# Patient Record
Sex: MALE | Race: WHITE | NOT HISPANIC OR LATINO | Employment: FULL TIME | URBAN - METROPOLITAN AREA
[De-identification: names, ages, dates, MRNs, and addresses within clinical notes are randomized per-mention and may not be internally consistent; named-entity substitution may affect disease eponyms.]

---

## 2018-06-16 ENCOUNTER — HOSPITAL ENCOUNTER (INPATIENT)
Facility: HOSPITAL | Age: 63
LOS: 3 days | Discharge: HOME | DRG: 637 | End: 2018-06-19
Attending: EMERGENCY MEDICINE | Admitting: FAMILY MEDICINE
Payer: COMMERCIAL

## 2018-06-16 DIAGNOSIS — E11.40 DIABETIC NEUROPATHY (HCC): ICD-10-CM

## 2018-06-16 DIAGNOSIS — R77.8 ELEVATED TROPONIN: ICD-10-CM

## 2018-06-16 DIAGNOSIS — R73.9 HYPERGLYCEMIA: Primary | ICD-10-CM

## 2018-06-16 DIAGNOSIS — E11.9 NEW ONSET TYPE 2 DIABETES MELLITUS (HCC): ICD-10-CM

## 2018-06-16 DIAGNOSIS — E87.6 HYPOKALEMIA: ICD-10-CM

## 2018-06-16 DIAGNOSIS — E11.65 TYPE 2 DIABETES MELLITUS WITH HYPERGLYCEMIA (HCC): ICD-10-CM

## 2018-06-16 PROBLEM — I10 HYPERTENSION: Status: ACTIVE | Noted: 2018-06-16

## 2018-06-16 PROBLEM — R79.89 ELEVATED TROPONIN: Status: ACTIVE | Noted: 2018-06-16

## 2018-06-16 LAB
ALBUMIN SERPL BCP-MCNC: 4.1 G/DL (ref 3.5–5)
ALP SERPL-CCNC: 97 U/L (ref 46–116)
ALT SERPL W P-5'-P-CCNC: 41 U/L (ref 12–78)
ANION GAP SERPL CALCULATED.3IONS-SCNC: 8 MMOL/L (ref 4–13)
AST SERPL W P-5'-P-CCNC: 16 U/L (ref 5–45)
BASOPHILS # BLD AUTO: 0.05 THOUSANDS/ΜL (ref 0–0.1)
BASOPHILS NFR BLD AUTO: 1 % (ref 0–1)
BILIRUB SERPL-MCNC: 0.6 MG/DL (ref 0.2–1)
BUN SERPL-MCNC: 20 MG/DL (ref 5–25)
CALCIUM SERPL-MCNC: 9.5 MG/DL (ref 8.3–10.1)
CHLORIDE SERPL-SCNC: 95 MMOL/L (ref 100–108)
CO2 SERPL-SCNC: 27 MMOL/L (ref 21–32)
CREAT SERPL-MCNC: 1.18 MG/DL (ref 0.6–1.3)
EOSINOPHIL # BLD AUTO: 0.32 THOUSAND/ΜL (ref 0–0.61)
EOSINOPHIL NFR BLD AUTO: 5 % (ref 0–6)
ERYTHROCYTE [DISTWIDTH] IN BLOOD BY AUTOMATED COUNT: 13 % (ref 11.6–15.1)
GFR SERPL CREATININE-BSD FRML MDRD: 64 ML/MIN/1.73SQ M
GLUCOSE SERPL-MCNC: 270 MG/DL (ref 65–140)
GLUCOSE SERPL-MCNC: 330 MG/DL (ref 65–140)
GLUCOSE SERPL-MCNC: 340 MG/DL (ref 65–140)
HCT VFR BLD AUTO: 44.4 % (ref 36.5–49.3)
HGB BLD-MCNC: 15.7 G/DL (ref 12–17)
IMM GRANULOCYTES # BLD AUTO: 0.03 THOUSAND/UL (ref 0–0.2)
IMM GRANULOCYTES NFR BLD AUTO: 0 % (ref 0–2)
LYMPHOCYTES # BLD AUTO: 1.94 THOUSANDS/ΜL (ref 0.6–4.47)
LYMPHOCYTES NFR BLD AUTO: 29 % (ref 14–44)
MCH RBC QN AUTO: 28.4 PG (ref 26.8–34.3)
MCHC RBC AUTO-ENTMCNC: 35.4 G/DL (ref 31.4–37.4)
MCV RBC AUTO: 80 FL (ref 82–98)
MONOCYTES # BLD AUTO: 0.53 THOUSAND/ΜL (ref 0.17–1.22)
MONOCYTES NFR BLD AUTO: 8 % (ref 4–12)
NEUTROPHILS # BLD AUTO: 3.92 THOUSANDS/ΜL (ref 1.85–7.62)
NEUTS SEG NFR BLD AUTO: 57 % (ref 43–75)
NRBC BLD AUTO-RTO: 0 /100 WBCS
PLATELET # BLD AUTO: 229 THOUSANDS/UL (ref 149–390)
PMV BLD AUTO: 10.6 FL (ref 8.9–12.7)
POTASSIUM SERPL-SCNC: 3.1 MMOL/L (ref 3.5–5.3)
PROT SERPL-MCNC: 8 G/DL (ref 6.4–8.2)
RBC # BLD AUTO: 5.52 MILLION/UL (ref 3.88–5.62)
SODIUM SERPL-SCNC: 130 MMOL/L (ref 136–145)
TROPONIN I SERPL-MCNC: 0.09 NG/ML
WBC # BLD AUTO: 6.79 THOUSAND/UL (ref 4.31–10.16)

## 2018-06-16 PROCEDURE — 93005 ELECTROCARDIOGRAM TRACING: CPT

## 2018-06-16 PROCEDURE — 82948 REAGENT STRIP/BLOOD GLUCOSE: CPT

## 2018-06-16 PROCEDURE — 85025 COMPLETE CBC W/AUTO DIFF WBC: CPT | Performed by: EMERGENCY MEDICINE

## 2018-06-16 PROCEDURE — 99285 EMERGENCY DEPT VISIT HI MDM: CPT

## 2018-06-16 PROCEDURE — 36415 COLL VENOUS BLD VENIPUNCTURE: CPT

## 2018-06-16 PROCEDURE — 80053 COMPREHEN METABOLIC PANEL: CPT | Performed by: EMERGENCY MEDICINE

## 2018-06-16 PROCEDURE — 84484 ASSAY OF TROPONIN QUANT: CPT | Performed by: EMERGENCY MEDICINE

## 2018-06-16 RX ORDER — SODIUM CHLORIDE 9 MG/ML
75 INJECTION, SOLUTION INTRAVENOUS CONTINUOUS
Status: DISCONTINUED | OUTPATIENT
Start: 2018-06-16 | End: 2018-06-19

## 2018-06-16 RX ORDER — ACETAMINOPHEN 325 MG/1
650 TABLET ORAL EVERY 6 HOURS PRN
Status: DISCONTINUED | OUTPATIENT
Start: 2018-06-16 | End: 2018-06-19 | Stop reason: HOSPADM

## 2018-06-16 RX ORDER — ASPIRIN 81 MG/1
81 TABLET ORAL DAILY
Status: DISCONTINUED | OUTPATIENT
Start: 2018-06-17 | End: 2018-06-19 | Stop reason: HOSPADM

## 2018-06-16 RX ORDER — HYDROCHLOROTHIAZIDE 25 MG/1
25 TABLET ORAL DAILY
Status: DISCONTINUED | OUTPATIENT
Start: 2018-06-17 | End: 2018-06-19 | Stop reason: HOSPADM

## 2018-06-16 RX ORDER — AMLODIPINE BESYLATE 10 MG/1
10 TABLET ORAL DAILY
Status: DISCONTINUED | OUTPATIENT
Start: 2018-06-17 | End: 2018-06-19 | Stop reason: HOSPADM

## 2018-06-16 RX ORDER — HYDROCHLOROTHIAZIDE 25 MG/1
25 TABLET ORAL DAILY
COMMUNITY

## 2018-06-16 RX ORDER — QUINAPRIL 10 MG/1
10 TABLET ORAL DAILY
COMMUNITY

## 2018-06-16 RX ORDER — AMLODIPINE BESYLATE 5 MG/1
10 TABLET ORAL DAILY
COMMUNITY

## 2018-06-16 RX ORDER — QUINAPRIL 20 MG/1
10 TABLET ORAL DAILY
Status: DISCONTINUED | OUTPATIENT
Start: 2018-06-17 | End: 2018-06-19 | Stop reason: HOSPADM

## 2018-06-16 RX ORDER — ASPIRIN 81 MG/1
81 TABLET ORAL DAILY
COMMUNITY

## 2018-06-16 RX ORDER — ASPIRIN 81 MG/1
324 TABLET, CHEWABLE ORAL ONCE
Status: COMPLETED | OUTPATIENT
Start: 2018-06-16 | End: 2018-06-16

## 2018-06-16 RX ADMIN — SODIUM CHLORIDE 75 ML/HR: 0.9 INJECTION, SOLUTION INTRAVENOUS at 22:57

## 2018-06-16 RX ADMIN — ASPIRIN 81 MG 324 MG: 81 TABLET ORAL at 20:31

## 2018-06-16 RX ADMIN — SODIUM CHLORIDE 1000 ML: 0.9 INJECTION, SOLUTION INTRAVENOUS at 20:33

## 2018-06-16 RX ADMIN — INSULIN LISPRO 4 UNITS: 100 INJECTION, SOLUTION INTRAVENOUS; SUBCUTANEOUS at 22:56

## 2018-06-17 ENCOUNTER — APPOINTMENT (INPATIENT)
Dept: NON INVASIVE DIAGNOSTICS | Facility: HOSPITAL | Age: 63
DRG: 637 | End: 2018-06-17
Payer: COMMERCIAL

## 2018-06-17 LAB
ANION GAP SERPL CALCULATED.3IONS-SCNC: 10 MMOL/L (ref 4–13)
BASOPHILS # BLD AUTO: 0.06 THOUSANDS/ΜL (ref 0–0.1)
BASOPHILS NFR BLD AUTO: 1 % (ref 0–1)
BUN SERPL-MCNC: 14 MG/DL (ref 5–25)
CALCIUM SERPL-MCNC: 8.6 MG/DL (ref 8.3–10.1)
CHLORIDE SERPL-SCNC: 101 MMOL/L (ref 100–108)
CHOLEST SERPL-MCNC: 151 MG/DL (ref 50–200)
CHOLEST SERPL-MCNC: 152 MG/DL (ref 50–200)
CO2 SERPL-SCNC: 26 MMOL/L (ref 21–32)
CREAT SERPL-MCNC: 0.96 MG/DL (ref 0.6–1.3)
EOSINOPHIL # BLD AUTO: 0.28 THOUSAND/ΜL (ref 0–0.61)
EOSINOPHIL NFR BLD AUTO: 5 % (ref 0–6)
ERYTHROCYTE [DISTWIDTH] IN BLOOD BY AUTOMATED COUNT: 13.1 % (ref 11.6–15.1)
EST. AVERAGE GLUCOSE BLD GHB EST-MCNC: 260 MG/DL
GFR SERPL CREATININE-BSD FRML MDRD: 83 ML/MIN/1.73SQ M
GLUCOSE SERPL-MCNC: 243 MG/DL (ref 65–140)
GLUCOSE SERPL-MCNC: 250 MG/DL (ref 65–140)
GLUCOSE SERPL-MCNC: 265 MG/DL (ref 65–140)
GLUCOSE SERPL-MCNC: 270 MG/DL (ref 65–140)
GLUCOSE SERPL-MCNC: 380 MG/DL (ref 65–140)
HBA1C MFR BLD: 10.7 % (ref 4.2–6.3)
HCT VFR BLD AUTO: 40.9 % (ref 36.5–49.3)
HDLC SERPL-MCNC: 31 MG/DL (ref 40–60)
HDLC SERPL-MCNC: 32 MG/DL (ref 40–60)
HGB BLD-MCNC: 14.3 G/DL (ref 12–17)
IMM GRANULOCYTES # BLD AUTO: 0.02 THOUSAND/UL (ref 0–0.2)
IMM GRANULOCYTES NFR BLD AUTO: 0 % (ref 0–2)
LDLC SERPL CALC-MCNC: 89 MG/DL (ref 0–100)
LDLC SERPL CALC-MCNC: 90 MG/DL (ref 0–100)
LYMPHOCYTES # BLD AUTO: 1.53 THOUSANDS/ΜL (ref 0.6–4.47)
LYMPHOCYTES NFR BLD AUTO: 26 % (ref 14–44)
MAGNESIUM SERPL-MCNC: 1.8 MG/DL (ref 1.6–2.6)
MCH RBC QN AUTO: 28.4 PG (ref 26.8–34.3)
MCHC RBC AUTO-ENTMCNC: 35 G/DL (ref 31.4–37.4)
MCV RBC AUTO: 81 FL (ref 82–98)
MONOCYTES # BLD AUTO: 0.41 THOUSAND/ΜL (ref 0.17–1.22)
MONOCYTES NFR BLD AUTO: 7 % (ref 4–12)
NEUTROPHILS # BLD AUTO: 3.71 THOUSANDS/ΜL (ref 1.85–7.62)
NEUTS SEG NFR BLD AUTO: 61 % (ref 43–75)
NONHDLC SERPL-MCNC: 120 MG/DL
NRBC BLD AUTO-RTO: 0 /100 WBCS
PLATELET # BLD AUTO: 216 THOUSANDS/UL (ref 149–390)
PMV BLD AUTO: 11 FL (ref 8.9–12.7)
POTASSIUM SERPL-SCNC: 2.9 MMOL/L (ref 3.5–5.3)
RBC # BLD AUTO: 5.04 MILLION/UL (ref 3.88–5.62)
SODIUM SERPL-SCNC: 137 MMOL/L (ref 136–145)
TRIGL SERPL-MCNC: 150 MG/DL
TRIGL SERPL-MCNC: 154 MG/DL
TROPONIN I SERPL-MCNC: 0.11 NG/ML
TROPONIN I SERPL-MCNC: 0.13 NG/ML
TROPONIN I SERPL-MCNC: 0.13 NG/ML
TROPONIN I SERPL-MCNC: 0.14 NG/ML
TSH SERPL DL<=0.05 MIU/L-ACNC: 1.94 UIU/ML (ref 0.36–3.74)
WBC # BLD AUTO: 6.01 THOUSAND/UL (ref 4.31–10.16)

## 2018-06-17 PROCEDURE — 99223 1ST HOSP IP/OBS HIGH 75: CPT | Performed by: FAMILY MEDICINE

## 2018-06-17 PROCEDURE — 83735 ASSAY OF MAGNESIUM: CPT | Performed by: NURSE PRACTITIONER

## 2018-06-17 PROCEDURE — 80061 LIPID PANEL: CPT | Performed by: NURSE PRACTITIONER

## 2018-06-17 PROCEDURE — 82948 REAGENT STRIP/BLOOD GLUCOSE: CPT

## 2018-06-17 PROCEDURE — 85025 COMPLETE CBC W/AUTO DIFF WBC: CPT | Performed by: NURSE PRACTITIONER

## 2018-06-17 PROCEDURE — 80048 BASIC METABOLIC PNL TOTAL CA: CPT | Performed by: NURSE PRACTITIONER

## 2018-06-17 PROCEDURE — 84484 ASSAY OF TROPONIN QUANT: CPT | Performed by: INTERNAL MEDICINE

## 2018-06-17 PROCEDURE — 83036 HEMOGLOBIN GLYCOSYLATED A1C: CPT | Performed by: NURSE PRACTITIONER

## 2018-06-17 PROCEDURE — 84443 ASSAY THYROID STIM HORMONE: CPT | Performed by: NURSE PRACTITIONER

## 2018-06-17 PROCEDURE — 84484 ASSAY OF TROPONIN QUANT: CPT | Performed by: NURSE PRACTITIONER

## 2018-06-17 PROCEDURE — 93306 TTE W/DOPPLER COMPLETE: CPT

## 2018-06-17 RX ORDER — POTASSIUM CHLORIDE 20 MEQ/1
40 TABLET, EXTENDED RELEASE ORAL ONCE
Status: COMPLETED | OUTPATIENT
Start: 2018-06-17 | End: 2018-06-17

## 2018-06-17 RX ORDER — POTASSIUM CHLORIDE 14.9 MG/ML
20 INJECTION INTRAVENOUS ONCE
Status: COMPLETED | OUTPATIENT
Start: 2018-06-17 | End: 2018-06-17

## 2018-06-17 RX ADMIN — AMLODIPINE BESYLATE 10 MG: 10 TABLET ORAL at 09:01

## 2018-06-17 RX ADMIN — ASPIRIN 81 MG: 81 TABLET, COATED ORAL at 09:01

## 2018-06-17 RX ADMIN — POTASSIUM CHLORIDE 20 MEQ: 200 INJECTION, SOLUTION INTRAVENOUS at 06:32

## 2018-06-17 RX ADMIN — INSULIN LISPRO 3 UNITS: 100 INJECTION, SOLUTION INTRAVENOUS; SUBCUTANEOUS at 22:50

## 2018-06-17 RX ADMIN — INSULIN LISPRO 6 UNITS: 100 INJECTION, SOLUTION INTRAVENOUS; SUBCUTANEOUS at 17:45

## 2018-06-17 RX ADMIN — ACETAMINOPHEN 650 MG: 325 TABLET, FILM COATED ORAL at 20:44

## 2018-06-17 RX ADMIN — POTASSIUM CHLORIDE 40 MEQ: 1500 TABLET, EXTENDED RELEASE ORAL at 06:29

## 2018-06-17 RX ADMIN — INSULIN LISPRO 3 UNITS: 100 INJECTION, SOLUTION INTRAVENOUS; SUBCUTANEOUS at 08:53

## 2018-06-17 RX ADMIN — INSULIN LISPRO 3 UNITS: 100 INJECTION, SOLUTION INTRAVENOUS; SUBCUTANEOUS at 12:55

## 2018-06-17 RX ADMIN — QUINAPRIL HYDROCHLORIDE 10 MG: 20 TABLET, FILM COATED ORAL at 09:00

## 2018-06-17 RX ADMIN — HYDROCHLOROTHIAZIDE 25 MG: 25 TABLET ORAL at 09:00

## 2018-06-17 RX ADMIN — SODIUM CHLORIDE 75 ML/HR: 0.9 INJECTION, SOLUTION INTRAVENOUS at 12:57

## 2018-06-17 NOTE — PLAN OF CARE
CARDIOVASCULAR - ADULT     Maintains optimal cardiac output and hemodynamic stability Progressing     Absence of cardiac dysrhythmias or at baseline rhythm Progressing        Potential for Falls     Patient will remain free of falls Progressing        SAFETY ADULT     Maintain or return to baseline ADL function Progressing     Maintain or return mobility status to optimal level Progressing     Patient will remain free of falls Progressing

## 2018-06-17 NOTE — PLAN OF CARE
Maintains optimal cardiac output and hemodynamic stability Progressing      Discharge to home or other facility with appropriate resources Progressing      Absence or prevention of progression during hospitalization Progressing      Patient/family/caregiver demonstrates understanding of disease process, treatment plan, medications, and discharge instructions Progressing      Electrolytes maintained within normal limits Progressing      Fluid balance maintained Progressing      Glucose maintained within target range Progressing      Patient will remain free of falls Progressing

## 2018-06-17 NOTE — PLAN OF CARE
CARDIOVASCULAR - ADULT     Maintains optimal cardiac output and hemodynamic stability Progressing     Absence of cardiac dysrhythmias or at baseline rhythm Progressing        DISCHARGE PLANNING     Discharge to home or other facility with appropriate resources Progressing        INFECTION - ADULT     Absence or prevention of progression during hospitalization Progressing        Knowledge Deficit     Patient/family/caregiver demonstrates understanding of disease process, treatment plan, medications, and discharge instructions Progressing        METABOLIC, FLUID AND ELECTROLYTES - ADULT     Electrolytes maintained within normal limits Progressing     Fluid balance maintained Progressing     Glucose maintained within target range Progressing        Potential for Falls     Patient will remain free of falls Progressing        SAFETY ADULT     Maintain or return to baseline ADL function Progressing     Maintain or return mobility status to optimal level Progressing     Patient will remain free of falls Progressing

## 2018-06-17 NOTE — ASSESSMENT & PLAN NOTE
Stable  Pre-hospital, patient report was not feeling well, excessive thirst, blurred vision, urinary frequency, check his blood sugar at home noted to be greater than 500  Patient report new onset of symptoms  Recent doctor's visit about 6 months ago  SSI initiated with coverage  Diabetic education  F/U Consult Nutrition  Monitor associated risk factors

## 2018-06-17 NOTE — CONSULTS
Consultation - Cardiology   Shanice Rondon 70 y o  male MRN: [id number]  Unit/Bed#: -01 Encounter: [id number]    Assessment/Plan     Assessment:  1  Indeterminate trop X 3  2  HTN   3  DM 2     Plan:  1  Obtain trop X 2 or until value trending down  2  Echo     History of Present Illness   Physician Requesting Consult: Kingsley Garrison,*  Reason for Consult / Principal Problem: elv trop  HPI: Shanice Rondon is a 70y o  year old male who presents with elevated sugars in the 400s  The patient PMH is significant for HTN and DM 2 only  He reports he came to ED cause his sugars were in the 400s and not coming down  He had trop X 3 obtained all which are in the indeterminate range  He denies chest pain or SOB with activity  He denies palpitations, swelling, syncope or LOC  He denies any family hx of CAD  Consults    Review of Systems   Constitutional: Negative  Respiratory: Negative  Cardiovascular: Negative  Neurological: Negative for dizziness, syncope and light-headedness  Historical Information   Past Medical History:   Diagnosis Date    Hypertension      Past Surgical History:   Procedure Laterality Date    CHOLECYSTECTOMY       History   Alcohol Use No     History   Drug Use No     History   Smoking Status    Never Smoker   Smokeless Tobacco    Never Used     Family History: History reviewed  No pertinent family history      Meds/Allergies   all current active meds have been reviewed, current meds:   Current Facility-Administered Medications   Medication Dose Route Frequency    acetaminophen (TYLENOL) tablet 650 mg  650 mg Oral Q6H PRN    amLODIPine (NORVASC) tablet 10 mg  10 mg Oral Daily    aspirin (ECOTRIN LOW STRENGTH) EC tablet 81 mg  81 mg Oral Daily    hydrochlorothiazide (HYDRODIURIL) tablet 25 mg  25 mg Oral Daily    insulin lispro (HumaLOG) 100 units/mL subcutaneous injection 1-6 Units  1-6 Units Subcutaneous TID AC    insulin lispro (HumaLOG) 100 units/mL subcutaneous injection 1-6 Units  1-6 Units Subcutaneous HS    quinapril (ACCUPRIL) tablet 10 mg  10 mg Oral Daily    sodium chloride 0 9 % infusion  75 mL/hr Intravenous Continuous    and PTA meds:   Prior to Admission Medications   Prescriptions Last Dose Informant Patient Reported? Taking? amLODIPine (NORVASC) 5 mg tablet  Self Yes Yes   Sig: Take 10 mg by mouth daily     aspirin (ECOTRIN LOW STRENGTH) 81 mg EC tablet   Yes Yes   Sig: Take 81 mg by mouth daily   hydrochlorothiazide (HYDRODIURIL) 25 mg tablet   Yes Yes   Sig: Take 25 mg by mouth daily   quinapril (ACCUPRIL) 10 mg tablet  Self Yes Yes   Sig: Take 10 mg by mouth daily      Facility-Administered Medications: None     No Known Allergies    Objective   Vitals: Blood pressure 128/69, pulse (!) 51, temperature 98 °F (36 7 °C), temperature source Oral, resp  rate 17, height 5' 4" (1 626 m), SpO2 96 %  Orthostatic Blood Pressures      Most Recent Value   Blood Pressure  128/69 filed at 06/17/2018 0700   Patient Position - Orthostatic VS  Lying filed at 06/17/2018 0700            Intake/Output Summary (Last 24 hours) at 06/17/18 1037  Last data filed at 06/17/18 0900   Gross per 24 hour   Intake              340 ml   Output                0 ml   Net              340 ml       Invasive Devices     Peripheral Intravenous Line            Peripheral IV 06/16/18 Right Antecubital less than 1 day                Physical Exam   Constitutional: He is oriented to person, place, and time  He appears well-developed and well-nourished  HENT:   Head: Normocephalic  Eyes: Pupils are equal, round, and reactive to light  Neck: Normal range of motion  Neck supple  Cardiovascular: Normal rate, regular rhythm, normal heart sounds and intact distal pulses  Exam reveals no gallop and no friction rub  No murmur heard  Pulmonary/Chest: Effort normal and breath sounds normal    Abdominal: Soft  Bowel sounds are normal    Musculoskeletal: He exhibits no edema  Neurological: He is alert and oriented to person, place, and time  Skin: Skin is warm and dry  Lab Results:   I have personally reviewed pertinent lab results  CBC with diff:   Results from last 7 days  Lab Units 06/17/18  0445   WBC Thousand/uL 6 01   RBC Million/uL 5 04   HEMOGLOBIN g/dL 14 3   HEMATOCRIT % 40 9   MCV fL 81*   MCH pg 28 4   MCHC g/dL 35 0   RDW % 13 1   MPV fL 11 0   PLATELETS Thousands/uL 216     CMP:   Results from last 7 days  Lab Units 06/17/18  0445 06/16/18  1930   SODIUM mmol/L 137 130*   POTASSIUM mmol/L 2 9* 3 1*   CHLORIDE mmol/L 101 95*   CO2 mmol/L 26 27   ANION GAP mmol/L 10 8   BUN mg/dL 14 20   CREATININE mg/dL 0 96 1 18   GLUCOSE RANDOM mg/dL 270* 340*   CALCIUM mg/dL 8 6 9 5   AST U/L  --  16   ALT U/L  --  41   ALK PHOS U/L  --  97   TOTAL PROTEIN g/dL  --  8 0   BILIRUBIN TOTAL mg/dL  --  0 60   EGFR ml/min/1 73sq m 83 64     Troponin:   0  Lab Value Date/Time   TROPONINI 0 13 (H) 06/17/2018 0445   TROPONINI 0 11 (H) 06/17/2018 0135   TROPONINI 0 09 (H) 06/16/2018 1930     BNP:   Results from last 7 days  Lab Units 06/17/18  0445   SODIUM mmol/L 137   POTASSIUM mmol/L 2 9*   CHLORIDE mmol/L 101   CO2 mmol/L 26   ANION GAP mmol/L 10   BUN mg/dL 14   CREATININE mg/dL 0 96   GLUCOSE RANDOM mg/dL 270*   CALCIUM mg/dL 8 6   EGFR ml/min/1 73sq m 83     Coags:     TSH:   Results from last 7 days  Lab Units 06/17/18  0135   TSH 3RD GENERATON uIU/mL 1 936     Magnesium:   Results from last 7 days  Lab Units 06/17/18  0445   MAGNESIUM mg/dL 1 8     Lipid Profile:   Results from last 7 days  Lab Units 06/17/18  0445   HDL mg/dL 31*   CHOLESTEROL mg/dL 151   LDL CALC mg/dL 89   TRIGLYCERIDES mg/dL 154*     Imaging: I have personally reviewed pertinent reports  EKG:   VTE Prophylaxis:     Code Status: Level 1 - Full Code  Advance Directive and Living Will:      Power of :    POLST:      Counseling / Coordination of Care  Total floor / unit time spent today 50 minutes  Greater than 50% of total time was spent with the patient and / or family counseling and / or coordination of care  A description of the counseling / coordination of care: 50

## 2018-06-17 NOTE — PROGRESS NOTES
Progress Note - Mata Neri 1955, 70 y o  male MRN: [id number]    Unit/Bed#: -01 Encounter: [id number]    Primary Care Provider: No primary care provider on file  Date and time admitted to hospital: 6/16/2018  7:17 PM        * Hyperglycemia   Assessment & Plan    Pre-hospital, patient report was not feeling well, excessive thirst, blurred vision, urinary frequency, check his blood sugar at home noted to be greater than 500  Patient report new onset of symptoms  Recent doctor's visit about 6 months ago  SSI initiated with coverage  Diabetic education  Hemoglobin A1c  Lifestyle modification, nutrition counseling done  Will consult Nutrition  Monitor associated risk factors  Hypokalemia   Assessment & Plan    Present on admission, 3 1  Will replete  Hypertension   Assessment & Plan    Continue quinapril, hydrochlorothiazide, Norvasc  Elevated troponin   Assessment & Plan    Likely NSTEMI  Troponin 0 09  Patient denies chest pain, bradycardia  Will trend troponin  EKG notes sinus Duglas Vail  Will consult Cardiology for further management  Telemetry          Hyponatremia:  Normal saline, monitor BMP in the a m  VTE Prophylaxis: Heparin  / sequential compression device   Code Status: Full code  POLST: There is no POLST form on file for this patient (pre-hospital)  Discussion with family: No Family at the bedside    Anticipated Length of Stay:  Patient will be admitted on an Inpatient basis with an anticipated length of stay of  greater than 2 midnights  Justification for Hospital Stay:  Hyperglycemia    Total Time for Visit, including Counseling / Coordination of Care: 1 hour  Greater than 50% of this total time spent on direct patient counseling and coordination of care      Chief Complaint:   Symptomatic hyperglycemia    History of Present Illness:    Mata Neri is a 70 y o  male history of hypertension who presents with chief complaint of increased thirst, for urinary frequency, dizziness, lightheadedness, not feeling well past few days purchased a glucometer to check his blood sugar it was greater than 500"  Review of Systems:    Review of Systems   Constitutional: Positive for fatigue  Eyes: Positive for photophobia  Gastrointestinal:        Thirst   Genitourinary: Positive for dysuria and frequency  Neurological: Positive for dizziness  All other systems reviewed and are negative  Past Medical and Surgical History:     Past Medical History:   Diagnosis Date    Hypertension        Past Surgical History:   Procedure Laterality Date    CHOLECYSTECTOMY         Meds/Allergies:    Prior to Admission medications    Medication Sig Start Date End Date Taking? Authorizing Provider   amLODIPine (NORVASC) 5 mg tablet Take 10 mg by mouth daily     Yes Historical Provider, MD   aspirin (ECOTRIN LOW STRENGTH) 81 mg EC tablet Take 81 mg by mouth daily   Yes Historical Provider, MD   hydrochlorothiazide (HYDRODIURIL) 25 mg tablet Take 25 mg by mouth daily   Yes Historical Provider, MD   quinapril (ACCUPRIL) 10 mg tablet Take 10 mg by mouth daily   Yes Historical Provider, MD     I have reviewed home medications with patient personally      Allergies: No Known Allergies    Social History:     Marital Status: /Civil Union   Occupation:  Retired  Patient Pre-hospital Living Situation:  Home  Patient Pre-hospital Level of Mobility:  Independent  Patient Pre-hospital Diet Restrictions:  None  Substance Use History:   History   Alcohol Use No     History   Smoking Status    Never Smoker   Smokeless Tobacco    Never Used     History   Drug Use No       Family History:    non-contributory    Physical Exam:     Vitals:   Blood Pressure: 139/62 (06/16/18 2154)  Pulse: (!) 52 (06/16/18 2154)  Temperature: 98 1 °F (36 7 °C) (06/16/18 2154)  Temp Source: Oral (06/16/18 2154)  Respirations: 18 (06/16/18 2154)  SpO2: 95 % (06/16/18 2154)    Physical Exam Constitutional: He is oriented to person, place, and time  He appears well-developed and well-nourished  No distress  HENT:   Head: Normocephalic and atraumatic  Mouth/Throat: Oropharynx is clear and moist  Mucous membranes are dry  Neck: Normal range of motion  Neck supple  No thyromegaly present  Cardiovascular: Normal rate, regular rhythm, normal heart sounds and intact distal pulses  No murmur heard  Pulmonary/Chest: Effort normal and breath sounds normal  No respiratory distress  He has no wheezes  He exhibits no tenderness  Abdominal: Soft  Bowel sounds are normal  He exhibits no distension  There is no tenderness  Musculoskeletal: Normal range of motion  He exhibits no edema or tenderness  Lymphadenopathy:     He has no cervical adenopathy  Neurological: He is alert and oriented to person, place, and time  Skin: Skin is warm and dry  He is not diaphoretic  There is pallor  Psychiatric: He has a normal mood and affect  Nursing note and vitals reviewed  Additional Data:     Lab Results: I have personally reviewed pertinent reports  Results from last 7 days  Lab Units 06/16/18  1930   WBC Thousand/uL 6 79   HEMOGLOBIN g/dL 15 7   HEMATOCRIT % 44 4   PLATELETS Thousands/uL 229   NEUTROS PCT % 57   LYMPHS PCT % 29   MONOS PCT % 8   EOS PCT % 5       Results from last 7 days  Lab Units 06/16/18  1930   SODIUM mmol/L 130*   POTASSIUM mmol/L 3 1*   CHLORIDE mmol/L 95*   CO2 mmol/L 27   BUN mg/dL 20   CREATININE mg/dL 1 18   CALCIUM mg/dL 9 5   TOTAL PROTEIN g/dL 8 0   BILIRUBIN TOTAL mg/dL 0 60   ALK PHOS U/L 97   ALT U/L 41   AST U/L 16   GLUCOSE RANDOM mg/dL 340*           Results from last 7 days  Lab Units 06/16/18 2035 06/16/18  1911   POC GLUCOSE mg/dl 270* 330*           Imaging: I have personally reviewed pertinent reports  No orders to display       EKG, Pathology, and Other Studies Reviewed on Admission:   · EKG: Sinus    Allscripts / Epic Records Reviewed:  Yes ** Please Note: This note has been constructed using a voice recognition system   **

## 2018-06-17 NOTE — ED PROVIDER NOTES
History  Chief Complaint   Patient presents with    Hyperglycemia - Symptomatic     pt stated noted feeling well the past few days, checked his BGL- 499, no hx of diabetes, also c/o excessive thirst     66-year-old male presents today complaining of hyperglycemia  States for the last 1-2 weeks he has felt fatigued, has had blurry vision, polydipsia polyuria  Today he went out and bought a glucometer and check his blood sugar and it was over 500  Has no known history of diabetes  States he most recently had blood work done about 6 months ago and it was normal  Denies chest pain but reports shortness of breath on exertion  History provided by:  Patient  Evaluation of Abnormal Diagnostic Test   Time since result:  Hours  Referred by: Self  Result type: chemistry    Chemistry:     Glucose:  High      Prior to Admission Medications   Prescriptions Last Dose Informant Patient Reported? Taking? amLODIPine (NORVASC) 5 mg tablet   Yes Yes   Sig: Take 5 mg by mouth daily      Facility-Administered Medications: None       Past Medical History:   Diagnosis Date    Hypertension        Past Surgical History:   Procedure Laterality Date    CHOLECYSTECTOMY         No family history on file  I have reviewed and agree with the history as documented  Social History   Substance Use Topics    Smoking status: Never Smoker    Smokeless tobacco: Never Used    Alcohol use No        Review of Systems   Constitutional: Positive for fatigue  Negative for chills, diaphoresis and fever  HENT: Negative for congestion  Eyes: Positive for visual disturbance  Respiratory: Positive for shortness of breath  Negative for chest tightness  Cardiovascular: Negative for chest pain  Gastrointestinal: Negative for abdominal pain, diarrhea and nausea  Endocrine: Positive for polydipsia, polyphagia and polyuria  Genitourinary: Negative for difficulty urinating and dysuria  Musculoskeletal: Negative for back pain  Skin: Negative for pallor, rash and wound  Allergic/Immunologic: Negative for immunocompromised state  Psychiatric/Behavioral: Negative for confusion  Physical Exam  Physical Exam   Constitutional: He is oriented to person, place, and time  He appears well-developed and well-nourished  HENT:   Head: Normocephalic and atraumatic  Mouth/Throat: Uvula is midline, oropharynx is clear and moist and mucous membranes are normal  No tonsillar exudate  Eyes: Pupils are equal, round, and reactive to light  Neck: Normal range of motion  Neck supple  Cardiovascular: Normal rate and regular rhythm  Pulmonary/Chest: Effort normal and breath sounds normal    Abdominal: Soft  Bowel sounds are normal  There is no tenderness  There is no rebound and no guarding  Musculoskeletal: Normal range of motion  Neurological: He is alert and oriented to person, place, and time  Patient moving all extremities equally, no focal neuro deficits noted  Skin: Skin is warm and dry  Psychiatric: He has a normal mood and affect  Nursing note and vitals reviewed        Vital Signs  ED Triage Vitals [06/16/18 1911]   Temperature Pulse Respirations Blood Pressure SpO2   97 7 °F (36 5 °C) 63 17 (!) 178/95 98 %      Temp Source Heart Rate Source Patient Position - Orthostatic VS BP Location FiO2 (%)   Oral Monitor Sitting Left arm --      Pain Score       No Pain           Vitals:    06/16/18 1911 06/16/18 2036   BP: (!) 178/95 157/86   Pulse: 63 (!) 54   Patient Position - Orthostatic VS: Sitting Lying       Visual Acuity  Visual Acuity      Most Recent Value   L Pupil Size (mm)  3   R Pupil Size (mm)  3          ED Medications  Medications   sodium chloride 0 9 % bolus 1,000 mL (1,000 mL Intravenous New Bag 6/16/18 2033)   aspirin chewable tablet 324 mg (324 mg Oral Given 6/16/18 2031)       Diagnostic Studies  Results Reviewed     Procedure Component Value Units Date/Time    Fingerstick Glucose (POCT) [[id number]] (Abnormal) Collected:  06/16/18 2035    Lab Status:  Final result Updated:  06/16/18 2040     POC Glucose 270 (H) mg/dl     Troponin I [[id number]]  (Abnormal) Collected:  06/16/18 1930    Lab Status:  Final result Specimen:  Blood from Arm, Right Updated:  06/16/18 1956     Troponin I 0 09 (H) ng/mL     Comprehensive metabolic panel [[id number]]  (Abnormal) Collected:  06/16/18 1930    Lab Status:  Final result Specimen:  Blood from Arm, Right Updated:  06/16/18 1951     Sodium 130 (L) mmol/L      Potassium 3 1 (L) mmol/L      Chloride 95 (L) mmol/L      CO2 27 mmol/L      Anion Gap 8 mmol/L      BUN 20 mg/dL      Creatinine 1 18 mg/dL      Glucose 340 (H) mg/dL      Calcium 9 5 mg/dL      AST 16 U/L      ALT 41 U/L      Alkaline Phosphatase 97 U/L      Total Protein 8 0 g/dL      Albumin 4 1 g/dL      Total Bilirubin 0 60 mg/dL      eGFR 64 ml/min/1 73sq m     Narrative:         National Kidney Disease Education Program recommendations are as follows:  GFR calculation is accurate only with a steady state creatinine  Chronic Kidney disease less than 60 ml/min/1 73 sq  meters  Kidney failure less than 15 ml/min/1 73 sq  meters      CBC and differential [[id number]]  (Abnormal) Collected:  06/16/18 1930    Lab Status:  Final result Specimen:  Blood from Arm, Right Updated:  06/16/18 1937     WBC 6 79 Thousand/uL      RBC 5 52 Million/uL      Hemoglobin 15 7 g/dL      Hematocrit 44 4 %      MCV 80 (L) fL      MCH 28 4 pg      MCHC 35 4 g/dL      RDW 13 0 %      MPV 10 6 fL      Platelets 028 Thousands/uL      nRBC 0 /100 WBCs      Neutrophils Relative 57 %      Immat GRANS % 0 %      Lymphocytes Relative 29 %      Monocytes Relative 8 %      Eosinophils Relative 5 %      Basophils Relative 1 %      Neutrophils Absolute 3 92 Thousands/µL      Immature Grans Absolute 0 03 Thousand/uL      Lymphocytes Absolute 1 94 Thousands/µL      Monocytes Absolute 0 53 Thousand/µL      Eosinophils Absolute 0 32 Thousand/µL      Basophils Absolute 0 05 Thousands/µL     Fingerstick Glucose (POCT) [[id number]]  (Abnormal) Collected:  06/16/18 1911    Lab Status:  Final result Updated:  06/16/18 1912     POC Glucose 330 (H) mg/dl                  No orders to display              Procedures  ECG 12 Lead Documentation  Date/Time: 6/16/2018 8:47 PM  Performed by: Aaron Gilman by: Johanne Loges     Indications / Diagnosis:  Shortness of breath  ECG reviewed by me, the ED Provider: yes    Patient location:  ED  Previous ECG:     Previous ECG:  Unavailable  Comments:      Sinus bradycardia at 57 beats per minute  Normal axis, slow R progression, nonspecific ST T wave abnormalities  No obvious evidence of ischemia  No old available for comparison  Phone Contacts  ED Phone Contact    ED Course                               MDM  Number of Diagnoses or Management Options  Elevated troponin: new and requires workup  Hyperglycemia: new and requires workup  Diagnosis management comments: 8:51 PM  MDM: Patient presents to the Emergency Department and was diagnosed with acute hyperglycemia, new onset diabetes  This is a new problem that will require additional planned work-up in a hospitalized setting  Clinical laboratory testing, radiology imaging, and medical testing (EKG) were ordered  I independently reviewed the radiologic imaging, EKG, and laboratory studies  This case is considered high risk secondary to the above listed disease process that poses a threat to bodily function that requires further diagnostic testing and management which may include the administration of parenteral controlled substances  Discussed with ELIZABETH  We had a detailed discussion of the patient's condition and case,  including need for admission  Accepts to his/her service  Bed request/bridging orders placed             Amount and/or Complexity of Data Reviewed  Clinical lab tests: ordered and reviewed  Tests in the radiology section of CPT®: ordered and reviewed  Tests in the medicine section of CPT®: reviewed and ordered  Decide to obtain previous medical records or to obtain history from someone other than the patient: yes  Review and summarize past medical records: yes  Discuss the patient with other providers: yes  Independent visualization of images, tracings, or specimens: yes    Risk of Complications, Morbidity, and/or Mortality  Presenting problems: high  Diagnostic procedures: high  Management options: high    Patient Progress  Patient progress: stable    CritCare Time    Disposition  Final diagnoses:   Hyperglycemia   Elevated troponin     Time reflects when diagnosis was documented in both MDM as applicable and the Disposition within this note     Time User Action Codes Description Comment    6/16/2018  8:50 PM Ami Newton Add [R73 9] Hyperglycemia     6/16/2018  8:50 PM Viky Byrd Add [R74 8] Elevated troponin       ED Disposition     None      Follow-up Information    None         Patient's Medications   Discharge Prescriptions    No medications on file     No discharge procedures on file      ED Provider  Electronically Signed by           Jasmina Farmer DO  06/16/18 1741

## 2018-06-17 NOTE — H&P
H&P: La Delgado 1955, 70 y o  male MRN: [id number]     Unit/Bed#: -01 Encounter: [id number]     Primary Care Provider: No primary care provider on file  Date and time admitted to hospital: 6/16/2018  7:17 PM               * Hyperglycemia   Assessment & Plan     Pre-hospital, patient report was not feeling well, excessive thirst, blurred vision, urinary frequency, check his blood sugar at home noted to be greater than 500  Patient report new onset of symptoms  Recent doctor's visit about 6 months ago  SSI initiated with coverage  Diabetic education  Hemoglobin A1c  Lifestyle modification, nutrition counseling done  Will consult Nutrition  Monitor associated risk factors           Hypokalemia   Assessment & Plan     Present on admission, 3 1  Will replete           Hypertension   Assessment & Plan     Continue quinapril, hydrochlorothiazide, Norvasc           Elevated troponin   Assessment & Plan     Likely NSTEMI  Troponin 0 09  Patient denies chest pain, bradycardia  Will trend troponin  EKG notes sinus Gretchen Matter  Will consult Cardiology for further management  Telemetry             Hyponatremia:  Normal saline, monitor BMP in the a m        VTE Prophylaxis: Heparin  / sequential compression device   Code Status: Full code  POLST: There is no POLST form on file for this patient (pre-hospital)  Discussion with family: No Family at the bedside     Anticipated Length of Stay:  Patient will be admitted on an Inpatient basis with an anticipated length of stay of  greater than 2 midnights  Justification for Hospital Stay:  Hyperglycemia     Total Time for Visit, including Counseling / Coordination of Care: 1 hour    Greater than 50% of this total time spent on direct patient counseling and coordination of care      Chief Complaint:   Symptomatic hyperglycemia     History of Present Illness:     La Dlegado is a 70 y o  male history of hypertension who presents with chief complaint of increased thirst, for urinary frequency, dizziness, lightheadedness, not feeling well past few days purchased a glucometer to check his blood sugar it was greater than 500"     Review of Systems:     Review of Systems   Constitutional: Positive for fatigue  Eyes: Positive for photophobia  Gastrointestinal:        Thirst   Genitourinary: Positive for dysuria and frequency  Neurological: Positive for dizziness  All other systems reviewed and are negative         Past Medical and Surgical History:      Medical History        Past Medical History:   Diagnosis Date    Hypertension              Surgical History         Past Surgical History:   Procedure Laterality Date    CHOLECYSTECTOMY                Meds/Allergies:             Prior to Admission medications    Medication Sig Start Date End Date Taking?  Authorizing Provider   amLODIPine (NORVASC) 5 mg tablet Take 10 mg by mouth daily       Yes Historical Provider, MD   aspirin (ECOTRIN LOW STRENGTH) 81 mg EC tablet Take 81 mg by mouth daily     Yes Historical Provider, MD   hydrochlorothiazide (HYDRODIURIL) 25 mg tablet Take 25 mg by mouth daily     Yes Historical Provider, MD   quinapril (ACCUPRIL) 10 mg tablet Take 10 mg by mouth daily     Yes Historical Provider, MD      I have reviewed home medications with patient personally      Allergies: No Known Allergies     Social History:     Marital Status: /Civil Union   Occupation:  Retired  Patient Pre-hospital Living Situation:  Home  Patient Pre-hospital Level of Mobility:  Independent  Patient Pre-hospital Diet Restrictions:  None  Substance Use History:       History   Alcohol Use No          History   Smoking Status    Never Smoker   Smokeless Tobacco    Never Used          History   Drug Use No         Family History:     non-contributory     Physical Exam:      Vitals:   Blood Pressure: 139/62 (06/16/18 2154)  Pulse: (!) 52 (06/16/18 2154)  Temperature: 98 1 °F (36 7 °C) (06/16/18 2154)  Temp Source: Oral (06/16/18 2154)  Respirations: 18 (06/16/18 2154)  SpO2: 95 % (06/16/18 2154)     Physical Exam   Constitutional: He is oriented to person, place, and time  He appears well-developed and well-nourished  No distress  HENT:   Head: Normocephalic and atraumatic  Mouth/Throat: Oropharynx is clear and moist  Mucous membranes are dry  Neck: Normal range of motion  Neck supple  No thyromegaly present  Cardiovascular: Sinus Gian normal heart sounds and intact distal pulses  No murmur heard  Pulmonary/Chest: Effort normal and breath sounds normal  No respiratory distress  He has no wheezes  He exhibits no tenderness  Abdominal: Soft  Bowel sounds are normal  He exhibits no distension  There is no tenderness  Musculoskeletal: Normal range of motion  He exhibits no edema or tenderness  Lymphadenopathy:     He has no cervical adenopathy  Neurological: He is alert and oriented to person, place, and time  Skin: Skin is warm and dry  He is not diaphoretic  There is pallor  Psychiatric: He has a normal mood and affect     Nursing note and vitals reviewed      Additional Data:      Lab Results: I have personally reviewed pertinent reports           Results from last 7 days  Lab Units 06/16/18  1930   WBC Thousand/uL 6 79   HEMOGLOBIN g/dL 15 7   HEMATOCRIT % 44 4   PLATELETS Thousands/uL 229   NEUTROS PCT % 57   LYMPHS PCT % 29   MONOS PCT % 8   EOS PCT % 5         Results from last 7 days  Lab Units 06/16/18  1930   SODIUM mmol/L 130*   POTASSIUM mmol/L 3 1*   CHLORIDE mmol/L 95*   CO2 mmol/L 27   BUN mg/dL 20   CREATININE mg/dL 1 18   CALCIUM mg/dL 9 5   TOTAL PROTEIN g/dL 8 0   BILIRUBIN TOTAL mg/dL 0 60   ALK PHOS U/L 97   ALT U/L 41   AST U/L 16   GLUCOSE RANDOM mg/dL 340*             Results from last 7 days  Lab Units 06/16/18 2035 06/16/18  1911   POC GLUCOSE mg/dl 270* 330*             Imaging: I have personally reviewed pertinent reports        No orders to display         EKG, Pathology, and Other Studies Reviewed on Admission:   · EKG: Sinus     Allscripts / Epic Records Reviewed: Yes      ** Please Note: This note has been constructed using a voice recognition system   **

## 2018-06-17 NOTE — CASE MANAGEMENT
Initial Clinical Review    Admission: Date/Time/Statement: 6/16/18 @ 2032     Orders Placed This Encounter   Procedures    Inpatient Admission (expected length of stay for this patient is greater than two midnights)     Standing Status:   Standing     Number of Occurrences:   1     Order Specific Question:   Admitting Physician     Answer:   Scott Dang [[id number]]     Order Specific Question:   Level of Care     Answer:   Med Surg [16]     Order Specific Question:   Estimated length of stay     Answer:   More than 2 Midnights     Order Specific Question:   Certification     Answer:   I certify that inpatient services are medically necessary for this patient for a duration of greater than two midnights  See H&P and MD Progress Notes for additional information about the patient's course of treatment  ED: Date/Time/Mode of Arrival:   ED Arrival Information     Expected Arrival Acuity Means of Arrival Escorted By Service Admission Type    - 6/16/2018 18:58 Emergent Walk-In Spouse General Medicine Emergency    Arrival Complaint    HIGH BLOOD SUGAR          Chief Complaint:   Chief Complaint   Patient presents with    Hyperglycemia - Symptomatic     pt stated noted feeling well the past few days, checked his BGL- 499, no hx of diabetes, also c/o excessive thirst       History of Illness: 70 y  o  male history of hypertension who presents with chief complaint of increased thirst, for urinary frequency, dizziness, lightheadedness, not feeling well past few days purchased a glucometer to check his blood sugar it was greater than 500"      ED Vital Signs:   ED Triage Vitals [06/16/18 1911]   Temperature Pulse Respirations Blood Pressure SpO2   97 7 °F (36 5 °C) 63 17 (!) 178/95 98 %      Temp Source Heart Rate Source Patient Position - Orthostatic VS BP Location FiO2 (%)   Oral Monitor Sitting Left arm --      Pain Score       No Pain        Wt Readings from Last 1 Encounters:   06/17/18 79 kg (174 lb 2 6 oz) Vital Signs (abnormal):   Date/Time Temp Pulse Resp BP MAP (mmHg) SpO2 O2 Device Patient Position - Orthostatic VS   06/17/18 1100 98 7 °F (37 1 °C)  52 17 117/65 -- 94 % None (Room air) Lying   06/17/18 0700 98 °F (36 7 °C)  51 17 128/69 -- 96 % None (Room air) Lying   06/17/18 0300 98 2 °F (36 8 °C)  49 18 126/67 90 92 % None (Room air) Lying   06/16/18 2300 98 1 °F (36 7 °C)  54 18 121/65 86          Abnormal Labs:    06/16/18 1930    Sodium 136 - 145 mmol/L 130     Potassium 3 5 - 5 3 mmol/L 3 1     Chloride 100 - 108 mmol/L 95     CO2 21 - 32 mmol/L 27    Anion Gap 4 - 13 mmol/L 8    BUN 5 - 25 mg/dL 20    Creatinine 0 60 - 1 30 mg/dL 1 18    Comment: Standardized to IDMS reference method   Glucose 65 - 140 mg/dL 340        06/17/18 0445    Sodium 136 - 145 mmol/L 137    Potassium 3 5 - 5 3 mmol/L 2 9        06/16/18 1930    Troponin I <=0 04 ng/mL 0 09         Diagnostic Test Results: Echo - pending    ED Treatment:   Medication Administration from 06/16/2018 1858 to 06/16/2018 2117       Date/Time Order Dose Route Action     06/16/2018 2033 sodium chloride 0 9 % bolus 1,000 mL 1,000 mL Intravenous New Bag     06/16/2018 2031 aspirin chewable tablet 324 mg 324 mg Oral Given          Past Medical/Surgical History: Active Ambulatory Problems     Diagnosis Date Noted    No Active Ambulatory Problems     Resolved Ambulatory Problems     Diagnosis Date Noted    No Resolved Ambulatory Problems     Past Medical History:   Diagnosis Date    Hypertension        Admitting Diagnosis: Hyperglycemia [R73 9]  Elevated troponin [R74 8]    Age/Sex: 70 y o  male    Assessment/Plan:   * Hyperglycemia   Assessment & Plan     Pre-hospital, patient report was not feeling well, excessive thirst, blurred vision, urinary frequency, check his blood sugar at home noted to be greater than 500  Patient report new onset of symptoms   Recent doctor's visit about 6 months ago  SSI initiated with coverage    Diabetic education  Hemoglobin A1c   Lifestyle modification, nutrition counseling done  Will consult Nutrition  Monitor associated risk factors           Hypokalemia   Assessment & Plan     Present on admission, 3 1   Will replete           Hypertension   Assessment & Plan     Continue quinapril, hydrochlorothiazide, Norvasc           Elevated troponin   Assessment & Plan     Likely NSTEMI   Troponin 0 09   Patient denies chest pain, bradycardia  Will trend troponin  EKG notes sinus Rosa Isela Bagley  Will consult Cardiology for further management  Telemetry             Hyponatremia:  Normal saline, monitor BMP in the a m        VTE Prophylaxis: Heparin  / sequential compression device   Code Status: Full code     Anticipated Length of Stay: Silvestre Choe will be admitted on an Inpatient basis with an anticipated length of stay of  greater than 2 midnights    Justification for Hospital Stay:  Hyperglycemia         Admission Orders:  Tele monitoring  Echo  Cardiology cons    Scheduled Meds:   Current Facility-Administered Medications:  amLODIPine 10 mg Oral Daily   aspirin 81 mg Oral Daily   hydrochlorothiazide 25 mg Oral Daily   insulin lispro 1-6 Units Subcutaneous TID AC   insulin lispro 1-6 Units Subcutaneous HS   quinapril 10 mg Oral Daily     Continuous Infusions:   sodium chloride 75 mL/hr Last Rate: 75 mL/hr (06/17/18 1257)     PRN Meds:   acetaminophen

## 2018-06-17 NOTE — ASSESSMENT & PLAN NOTE
Likely NSTEMI  Troponin 0 09  Patient denies chest pain  Elevate Troponoin  Echo LEFT VENTRICLE: Size was normal  Systolic function was normal  Ejection fraction was estimated to be 55 %  There were no regional wall motion abnormalities  Wall thickness was normal  No evidence of apical thrombus   DOPPLER: The study was  not technically sufficient to allow evaluation of LV diastolic function  EKG notes sinus Red John  F/u  Cardiology recommendation: call many time no respond to f/u recommendation

## 2018-06-18 PROBLEM — E11.65 TYPE 2 DIABETES MELLITUS WITH HYPERGLYCEMIA (HCC): Status: ACTIVE | Noted: 2018-06-18

## 2018-06-18 LAB
ANION GAP SERPL CALCULATED.3IONS-SCNC: 12 MMOL/L (ref 4–13)
BUN SERPL-MCNC: 16 MG/DL (ref 5–25)
CALCIUM SERPL-MCNC: 9.1 MG/DL (ref 8.3–10.1)
CHLORIDE SERPL-SCNC: 101 MMOL/L (ref 100–108)
CO2 SERPL-SCNC: 25 MMOL/L (ref 21–32)
CREAT SERPL-MCNC: 0.97 MG/DL (ref 0.6–1.3)
GFR SERPL CREATININE-BSD FRML MDRD: 82 ML/MIN/1.73SQ M
GLUCOSE SERPL-MCNC: 204 MG/DL (ref 65–140)
GLUCOSE SERPL-MCNC: 223 MG/DL (ref 65–140)
GLUCOSE SERPL-MCNC: 253 MG/DL (ref 65–140)
GLUCOSE SERPL-MCNC: 255 MG/DL (ref 65–140)
GLUCOSE SERPL-MCNC: 403 MG/DL (ref 65–140)
POTASSIUM SERPL-SCNC: 3.4 MMOL/L (ref 3.5–5.3)
SODIUM SERPL-SCNC: 138 MMOL/L (ref 136–145)

## 2018-06-18 PROCEDURE — 82948 REAGENT STRIP/BLOOD GLUCOSE: CPT

## 2018-06-18 PROCEDURE — 80048 BASIC METABOLIC PNL TOTAL CA: CPT | Performed by: FAMILY MEDICINE

## 2018-06-18 PROCEDURE — 99232 SBSQ HOSP IP/OBS MODERATE 35: CPT | Performed by: FAMILY MEDICINE

## 2018-06-18 RX ORDER — INSULIN GLARGINE 100 [IU]/ML
20 INJECTION, SOLUTION SUBCUTANEOUS
Status: DISCONTINUED | OUTPATIENT
Start: 2018-06-18 | End: 2018-06-19 | Stop reason: HOSPADM

## 2018-06-18 RX ORDER — HEPARIN SODIUM 5000 [USP'U]/ML
5000 INJECTION, SOLUTION INTRAVENOUS; SUBCUTANEOUS EVERY 8 HOURS SCHEDULED
Status: DISCONTINUED | OUTPATIENT
Start: 2018-06-18 | End: 2018-06-19 | Stop reason: HOSPADM

## 2018-06-18 RX ORDER — KETOROLAC TROMETHAMINE 30 MG/ML
15 INJECTION, SOLUTION INTRAMUSCULAR; INTRAVENOUS ONCE
Status: COMPLETED | OUTPATIENT
Start: 2018-06-18 | End: 2018-06-18

## 2018-06-18 RX ADMIN — HYDROCHLOROTHIAZIDE 25 MG: 25 TABLET ORAL at 08:06

## 2018-06-18 RX ADMIN — QUINAPRIL HYDROCHLORIDE 10 MG: 20 TABLET, FILM COATED ORAL at 08:06

## 2018-06-18 RX ADMIN — INSULIN LISPRO 2 UNITS: 100 INJECTION, SOLUTION INTRAVENOUS; SUBCUTANEOUS at 22:11

## 2018-06-18 RX ADMIN — HEPARIN SODIUM 5000 UNITS: 5000 INJECTION, SOLUTION INTRAVENOUS; SUBCUTANEOUS at 22:11

## 2018-06-18 RX ADMIN — ASPIRIN 81 MG: 81 TABLET, COATED ORAL at 08:06

## 2018-06-18 RX ADMIN — INSULIN LISPRO 2 UNITS: 100 INJECTION, SOLUTION INTRAVENOUS; SUBCUTANEOUS at 13:22

## 2018-06-18 RX ADMIN — HEPARIN SODIUM 5000 UNITS: 5000 INJECTION, SOLUTION INTRAVENOUS; SUBCUTANEOUS at 15:29

## 2018-06-18 RX ADMIN — INSULIN LISPRO 3 UNITS: 100 INJECTION, SOLUTION INTRAVENOUS; SUBCUTANEOUS at 08:07

## 2018-06-18 RX ADMIN — SODIUM CHLORIDE 75 ML/HR: 0.9 INJECTION, SOLUTION INTRAVENOUS at 16:35

## 2018-06-18 RX ADMIN — INSULIN GLARGINE 20 UNITS: 100 INJECTION, SOLUTION SUBCUTANEOUS at 22:11

## 2018-06-18 RX ADMIN — AMLODIPINE BESYLATE 10 MG: 10 TABLET ORAL at 08:06

## 2018-06-18 RX ADMIN — SODIUM CHLORIDE 75 ML/HR: 0.9 INJECTION, SOLUTION INTRAVENOUS at 03:33

## 2018-06-18 RX ADMIN — INSULIN LISPRO 6 UNITS: 100 INJECTION, SOLUTION INTRAVENOUS; SUBCUTANEOUS at 16:59

## 2018-06-18 RX ADMIN — KETOROLAC TROMETHAMINE 15 MG: 30 INJECTION, SOLUTION INTRAMUSCULAR at 20:57

## 2018-06-18 NOTE — PROGRESS NOTES
Progress Note - Mani Riley 1955, 70 y o  male MRN: [id number]    Unit/Bed#: -01 Encounter: [id number]    Primary Care Provider: No primary care provider on file  Date and time admitted to hospital: 6/16/2018  7:17 PM        Type 2 diabetes mellitus with hyperglycemia [facility])   Assessment & Plan    Lab Results   Component Value Date    HGBA1C 10 7 (H) 06/17/2018       Recent Labs      06/17/18   1605  06/17/18   2113  06/18/18   0554  06/18/18   1159   POCGLU  380*  250*  253*  223*       Blood Sugar Average: Last 72 hrs:  (P) 276 75   Lantus 20 Unit  Cont SSI   Patient will need to be discharge on insulin and oral hypoglycemic with closely outpatient follow up         * Hyperglycemia   Assessment & Plan    Stable  Pre-hospital, patient report was not feeling well, excessive thirst, blurred vision, urinary frequency, check his blood sugar at home noted to be greater than 500  Patient report new onset of symptoms  Recent doctor's visit about 6 months ago  SSI initiated with coverage  Diabetic education  F/U Consult Nutrition  Monitor associated risk factors  Elevated troponin   Assessment & Plan    Likely NSTEMI  Troponin 0 09  Patient denies chest pain  Elevate Troponoin  Echo LEFT VENTRICLE: Size was normal  Systolic function was normal  Ejection fraction was estimated to be 55 %  There were no regional wall motion abnormalities  Wall thickness was normal  No evidence of apical thrombus  DOPPLER: The study was  not technically sufficient to allow evaluation of LV diastolic function  EKG notes sinus Ileana Ace  F/u  Cardiology recommendation: call many time no respond to f/u recommendation         Hypokalemia   Assessment & Plan    F/u bmp        Hypertension   Assessment & Plan    Continue quinapril, hydrochlorothiazide, Norvasc            VTE Pharmacologic Prophylaxis:   Pharmacologic: Heparin  Mechanical VTE Prophylaxis in Place: Yes    Patient Centered Rounds: I have performed bedside rounds with nursing staff today  Discussions with Specialists or Other Care Team Provider:     Education and Discussions with Family / Patient: case     Time Spent for Care: 20 minutes  More than 50% of total time spent on counseling and coordination of care as described above  Current Length of Stay: 2 day(s)    Current Patient Status: Inpatient   Certification Statement: The patient will continue to require additional inpatient hospital stay due to acute above conditions    Discharge Plan: depend on clinical course    Code Status: Level 1 - Full Code      Subjective:   Patient states he is getting better, denied any chest pain, SOB, Palpitation  Still having hyperglycemia    Objective:     Vitals:   Temp (24hrs), Av 4 °F (36 9 °C), Min:97 9 °F (36 6 °C), Max:98 6 °F (37 °C)    HR:  [48-57] 48  Resp:  [17-18] 18  BP: (121-131)/(62-72) 126/70  SpO2:  [96 %] 96 %  Body mass index is 29 9 kg/m²  Input and Output Summary (last 24 hours): Intake/Output Summary (Last 24 hours) at 18 1307  Last data filed at 18 1144   Gross per 24 hour   Intake              310 ml   Output              550 ml   Net             -240 ml       Physical Exam:     Physical Exam   Constitutional: He is oriented to person, place, and time  No distress  Cardiovascular: Normal rate, regular rhythm, normal heart sounds and intact distal pulses  Exam reveals no gallop and no friction rub  No murmur heard  Pulmonary/Chest: Effort normal  No respiratory distress  He has no wheezes  He has no rales  He exhibits no tenderness  Abdominal: Soft  He exhibits no distension and no mass  There is no tenderness  There is no rebound and no guarding  Musculoskeletal: He exhibits no tenderness or deformity  Neurological: He is alert and oriented to person, place, and time  He has normal reflexes  He displays normal reflexes  No cranial nerve deficit  He exhibits normal muscle tone   Coordination normal    Skin: Skin is warm  He is not diaphoretic  Psychiatric: He has a normal mood and affect  Additional Data:     Labs:      Results from last 7 days  Lab Units 06/17/18  0445   WBC Thousand/uL 6 01   HEMOGLOBIN g/dL 14 3   HEMATOCRIT % 40 9   PLATELETS Thousands/uL 216   NEUTROS PCT % 61   LYMPHS PCT % 26   MONOS PCT % 7   EOS PCT % 5       Results from last 7 days  Lab Units 06/18/18  1159  06/16/18  1930   SODIUM mmol/L 138  < > 130*   POTASSIUM mmol/L 3 4*  < > 3 1*   CHLORIDE mmol/L 101  < > 95*   CO2 mmol/L 25  < > 27   BUN mg/dL 16  < > 20   CREATININE mg/dL 0 97  < > 1 18   CALCIUM mg/dL 9 1  < > 9 5   TOTAL PROTEIN g/dL  --   --  8 0   BILIRUBIN TOTAL mg/dL  --   --  0 60   ALK PHOS U/L  --   --  97   ALT U/L  --   --  41   AST U/L  --   --  16   GLUCOSE RANDOM mg/dL 255*  < > 340*   < > = values in this interval not displayed  * I Have Reviewed All Lab Data Listed Above  * Additional Pertinent Lab Tests Reviewed:  All Labs Within Last 24 Hours Reviewed    Imaging:    Imaging Reports Reviewed Today Include:   Imaging Personally Reviewed by Myself Includes:      Recent Cultures (last 7 days):           Last 24 Hours Medication List:     Current Facility-Administered Medications:  acetaminophen 650 mg Oral Q6H PRN JESS Powell    amLODIPine 10 mg Oral Daily JESS Powell    aspirin 81 mg Oral Daily JESS Powell    hydrochlorothiazide 25 mg Oral Daily JESS Powell    insulin glargine 20 Units Subcutaneous HS Tristan Walker MD    insulin lispro 1-6 Units Subcutaneous TID AC JESS Powell    insulin lispro 1-6 Units Subcutaneous HS JESS Powell    quinapril 10 mg Oral Daily JESS Powell    sodium chloride 75 mL/hr Intravenous Continuous JESS Powell Last Rate: 75 mL/hr (06/18/18 0333)        Today, Patient Was Seen By: Tristan Walker MD    ** Please Note: Dragon 360 Dictation voice to text software may have been used in the creation of this document   **

## 2018-06-18 NOTE — ASSESSMENT & PLAN NOTE
Lab Results   Component Value Date    HGBA1C 10 7 (H) 06/17/2018       Recent Labs      06/17/18   1605  06/17/18   2113  06/18/18   0554  06/18/18   1159   POCGLU  380*  250*  253*  223*       Blood Sugar Average: Last 72 hrs:  (P) 276 75   Lantus 20 Unit  Cont SSI   Patient will need to be discharge on insulin and oral hypoglycemic with closely outpatient follow up

## 2018-06-19 ENCOUNTER — APPOINTMENT (INPATIENT)
Dept: NON INVASIVE DIAGNOSTICS | Facility: HOSPITAL | Age: 63
DRG: 637 | End: 2018-06-19
Payer: COMMERCIAL

## 2018-06-19 ENCOUNTER — APPOINTMENT (INPATIENT)
Dept: NUCLEAR MEDICINE | Facility: HOSPITAL | Age: 63
DRG: 637 | End: 2018-06-19
Payer: COMMERCIAL

## 2018-06-19 VITALS
BODY MASS INDEX: 30.49 KG/M2 | HEART RATE: 59 BPM | RESPIRATION RATE: 18 BRPM | HEIGHT: 64 IN | SYSTOLIC BLOOD PRESSURE: 144 MMHG | DIASTOLIC BLOOD PRESSURE: 74 MMHG | TEMPERATURE: 97.8 F | OXYGEN SATURATION: 97 % | WEIGHT: 178.57 LBS

## 2018-06-19 PROBLEM — R77.8 ELEVATED TROPONIN: Status: RESOLVED | Noted: 2018-06-16 | Resolved: 2018-06-19

## 2018-06-19 PROBLEM — E11.9 NEW ONSET TYPE 2 DIABETES MELLITUS (HCC): Status: ACTIVE | Noted: 2018-06-19

## 2018-06-19 PROBLEM — R79.89 ELEVATED TROPONIN: Status: RESOLVED | Noted: 2018-06-16 | Resolved: 2018-06-19

## 2018-06-19 LAB
ANION GAP SERPL CALCULATED.3IONS-SCNC: 9 MMOL/L (ref 4–13)
ARRHY DURING EX: NORMAL
ATRIAL RATE: 57 BPM
BUN SERPL-MCNC: 15 MG/DL (ref 5–25)
CALCIUM SERPL-MCNC: 9.1 MG/DL (ref 8.3–10.1)
CHEST PAIN STATEMENT: NORMAL
CHLORIDE SERPL-SCNC: 103 MMOL/L (ref 100–108)
CO2 SERPL-SCNC: 27 MMOL/L (ref 21–32)
CREAT SERPL-MCNC: 1.01 MG/DL (ref 0.6–1.3)
GFR SERPL CREATININE-BSD FRML MDRD: 78 ML/MIN/1.73SQ M
GLUCOSE SERPL-MCNC: 210 MG/DL (ref 65–140)
GLUCOSE SERPL-MCNC: 243 MG/DL (ref 65–140)
GLUCOSE SERPL-MCNC: 250 MG/DL (ref 65–140)
GLUCOSE SERPL-MCNC: 280 MG/DL (ref 65–140)
MAX DIASTOLIC BP: 80 MMHG
MAX HEART RATE: 106 BPM
MAX PREDICTED HEART RATE: 155 BPM
MAX. SYSTOLIC BP: 159 MMHG
P AXIS: 49 DEGREES
POTASSIUM SERPL-SCNC: 3.4 MMOL/L (ref 3.5–5.3)
PR INTERVAL: 158 MS
PROTOCOL NAME: NORMAL
QRS AXIS: -9 DEGREES
QRSD INTERVAL: 96 MS
QT INTERVAL: 418 MS
QTC INTERVAL: 406 MS
REASON FOR TERMINATION: NORMAL
SODIUM SERPL-SCNC: 139 MMOL/L (ref 136–145)
T WAVE AXIS: 17 DEGREES
TARGET HR FORMULA: NORMAL
TEST INDICATION: NORMAL
TIME IN EXERCISE PHASE: NORMAL
VENTRICULAR RATE: 57 BPM

## 2018-06-19 PROCEDURE — 93010 ELECTROCARDIOGRAM REPORT: CPT | Performed by: INTERNAL MEDICINE

## 2018-06-19 PROCEDURE — 82948 REAGENT STRIP/BLOOD GLUCOSE: CPT

## 2018-06-19 PROCEDURE — 99239 HOSP IP/OBS DSCHRG MGMT >30: CPT | Performed by: INTERNAL MEDICINE

## 2018-06-19 PROCEDURE — 93017 CV STRESS TEST TRACING ONLY: CPT

## 2018-06-19 PROCEDURE — A9502 TC99M TETROFOSMIN: HCPCS

## 2018-06-19 PROCEDURE — 80048 BASIC METABOLIC PNL TOTAL CA: CPT | Performed by: INTERNAL MEDICINE

## 2018-06-19 PROCEDURE — 78452 HT MUSCLE IMAGE SPECT MULT: CPT

## 2018-06-19 RX ORDER — POTASSIUM CHLORIDE 20 MEQ/1
20 TABLET, EXTENDED RELEASE ORAL DAILY
Qty: 30 TABLET | Refills: 0 | Status: SHIPPED | OUTPATIENT
Start: 2018-06-19 | End: 2018-06-19

## 2018-06-19 RX ORDER — POTASSIUM CHLORIDE 20 MEQ/1
20 TABLET, EXTENDED RELEASE ORAL DAILY
Qty: 30 TABLET | Refills: 1 | OUTPATIENT
Start: 2018-06-19 | End: 2018-07-19

## 2018-06-19 RX ADMIN — INSULIN LISPRO 3 UNITS: 100 INJECTION, SOLUTION INTRAVENOUS; SUBCUTANEOUS at 12:14

## 2018-06-19 RX ADMIN — INSULIN LISPRO 4 UNITS: 100 INJECTION, SOLUTION INTRAVENOUS; SUBCUTANEOUS at 18:26

## 2018-06-19 RX ADMIN — INSULIN LISPRO 2 UNITS: 100 INJECTION, SOLUTION INTRAVENOUS; SUBCUTANEOUS at 07:21

## 2018-06-19 RX ADMIN — AMLODIPINE BESYLATE 10 MG: 10 TABLET ORAL at 08:55

## 2018-06-19 RX ADMIN — HEPARIN SODIUM 5000 UNITS: 5000 INJECTION, SOLUTION INTRAVENOUS; SUBCUTANEOUS at 05:39

## 2018-06-19 RX ADMIN — SODIUM CHLORIDE 75 ML/HR: 0.9 INJECTION, SOLUTION INTRAVENOUS at 05:40

## 2018-06-19 RX ADMIN — HEPARIN SODIUM 5000 UNITS: 5000 INJECTION, SOLUTION INTRAVENOUS; SUBCUTANEOUS at 15:07

## 2018-06-19 RX ADMIN — HYDROCHLOROTHIAZIDE 25 MG: 25 TABLET ORAL at 08:55

## 2018-06-19 RX ADMIN — QUINAPRIL HYDROCHLORIDE 10 MG: 20 TABLET, FILM COATED ORAL at 08:56

## 2018-06-19 RX ADMIN — REGADENOSON 0.4 MG: 0.08 INJECTION, SOLUTION INTRAVENOUS at 13:35

## 2018-06-19 RX ADMIN — ASPIRIN 81 MG: 81 TABLET, COATED ORAL at 08:55

## 2018-06-19 NOTE — PLAN OF CARE
Problem: DISCHARGE PLANNING - CARE MANAGEMENT  Goal: Discharge to post-acute care or home with appropriate resources  INTERVENTIONS:  - Conduct assessment to determine patient/family and health care team treatment goals, and need for post-acute services based on payer coverage, community resources, and patient preferences, and barriers to discharge  - Address psychosocial, clinical, and financial barriers to discharge as identified in assessment in conjunction with the patient/family and health care team  - Arrange appropriate level of post-acute services according to patient's   needs and preference and payer coverage in collaboration with the physician and health care team  - Communicate with and update the patient/family, physician, and health care team regarding progress on the discharge plan  - Arrange appropriate transportation to post-acute venues    Outcome: Progressing  Discharge pending stress test

## 2018-06-19 NOTE — PLAN OF CARE
CARDIOVASCULAR - ADULT     Maintains optimal cardiac output and hemodynamic stability Progressing     Absence of cardiac dysrhythmias or at baseline rhythm Progressing        DISCHARGE PLANNING     Discharge to home or other facility with appropriate resources Progressing        INFECTION - ADULT     Absence or prevention of progression during hospitalization Progressing        Knowledge Deficit     Patient/family/caregiver demonstrates understanding of disease process, treatment plan, medications, and discharge instructions Progressing        METABOLIC, FLUID AND ELECTROLYTES - ADULT     Electrolytes maintained within normal limits Progressing     Fluid balance maintained Progressing     Glucose maintained within target range Progressing        Nutrition/Hydration-ADULT     Nutrient/Hydration intake appropriate for improving, restoring or maintaining nutritional needs Progressing        Potential for Falls     Patient will remain free of falls Progressing        SAFETY ADULT     Maintain or return to baseline ADL function Progressing     Maintain or return mobility status to optimal level Progressing     Patient will remain free of falls Progressing

## 2018-06-19 NOTE — SOCIAL WORK
Cm met with patient at his bedside, patient alert and oriented during interview  Patient reports being completely independent with ADL's, is employed, no dme use, vna or str  Patient reports following a physician in [place], [place] and desires to continue following that PCP  Patient stated he fills his prescriptions at [facility] with no co-payment barriers  Patient reports his daughter and wife will assume the responsibility of his POA  Patient will receive a stress test today, possible discharge home  CM reviewed discharge planning process including the following: identifying help at home, patient preference for discharge planning needs, pharmacy preference, and availability of treatment team to discuss questions or concerns patient and/or family may have regarding understanding medications and recognizing signs and symptoms once discharged  CM also encouraged patient to follow up with all recommended appointments after discharge  Patient advised of importance for patient and family to participate in managing patients medical well being  CM name and role reviewed and Discharge Checklist provided  Encouraged patient and caregiver to review prior to discharge

## 2018-06-19 NOTE — DISCHARGE SUMMARY
Discharge Summary - [facility] Internal Medicine    Patient Information: Cathy Zamora 70 y o  male MRN: [id number]  Unit/Bed#: -01 Encounter: [id number]    Discharging Physician / Practitioner: Paulette Bagley DO  PCP: No primary care provider on file  Admission Date: 6/16/2018  Discharge Date: 06/19/18    Reason for Admission:  New onset diabetes    Discharge Diagnoses:     Principal Problem:    New onset type 2 diabetes mellitus ([facility] )  Active Problems:    Hypertension    Hypokalemia    Type 2 diabetes mellitus with hyperglycemia ([facility] )  Resolved Problems:    Elevated troponin      Consultations During Hospital Stay:  · Cardiology    Procedures Performed:     · Nuclear stress test    Significant Findings:     · Refer to hospital course    Incidental Findings:     Test Results Pending at Discharge (will require follow up):   ·      Outpatient Tests Requested:  ·     Complications:  None    Hospital Course: # New onset DM, hemoglobin A1c of 10 7  - initiated on insulin  Discharged on Lantus 20 units at bedtime  Verified with his pharmacy for insurance coverage of insulin pens  - will also continue on metformin 1000 mg b i d   - diabetic teaching provided per RN  - educated patient at length myself diet and lifestyle modification  - also prescribed all necessary diabetic supplies  - he already has an appointment his primary care physician scheduled for this Friday    # CP, non STEMI type 2  - Cardiology on board  - troponin peaked at 0 14 and trended down  - proceeded with nuclear stress test, normal    # HTN - stable    # Hypokalemia - repleted accordingly  May explain lower extremity cramping which patient was having which is now resolved  - continue on chronic potassium supplements given chronically on hydrochlorothiazide    Disposition:  Discharge to home  Plan of care extensively discussed with patient at length      Condition at Discharge: stable     Discharge Day Visit / Exam: Subjective:  Reports of resolved lower extremity cramping  Displays competency fingerstick checks and insulin administration  No acute events overnight  Vitals: Blood Pressure: 144/74 (06/19/18 1500)  Pulse: 59 (06/19/18 1500)  Temperature: 97 8 °F (36 6 °C) (06/19/18 1500)  Temp Source: Oral (06/19/18 1500)  Respirations: 18 (06/19/18 1500)  Height: 5' 4" (162 6 cm) (06/18/18 1144)  Weight - Scale: 81 kg (178 lb 9 2 oz) (06/19/18 0600)  SpO2: 97 % (06/19/18 1500)  Exam:   Physical Exam   Constitutional: He is oriented to person, place, and time  He appears well-developed and well-nourished  Neck: Neck supple  Cardiovascular: Normal rate, regular rhythm, normal heart sounds and intact distal pulses  Exam reveals no gallop and no friction rub  No murmur heard  Pulmonary/Chest: Effort normal and breath sounds normal  No respiratory distress  He has no wheezes  He has no rales  He exhibits no tenderness  Abdominal: Soft  Bowel sounds are normal  He exhibits no distension and no mass  There is no tenderness  There is no rebound and no guarding  Musculoskeletal: Normal range of motion  He exhibits no edema, tenderness or deformity  Neurological: He is alert and oriented to person, place, and time  He has normal reflexes  He displays normal reflexes  No cranial nerve deficit  He exhibits normal muscle tone  Coordination normal    Skin: Skin is warm and dry  Discharge instructions/Information to patient and family:   See after visit summary for information provided to patient and family  Provisions for Follow-Up Care:  See after visit summary for information related to follow-up care and any pertinent home health orders  Disposition:     Home    For Discharges to [facility]F:   · Not Applicable to this Patient - Not Applicable to this Patient    Planned Readmission: No     Discharge Statement:  I spent > 30 minutes discharging the patient   This time was spent on the day of discharge  I had direct contact with the patient on the day of discharge  Greater than 50% of the total time was spent examining patient, answering all patient questions, arranging and discussing plan of care with patient as well as directly providing post-discharge instructions  Additional time then spent on discharge activities  Discharge Medications:  See after visit summary for reconciled discharge medications provided to patient and family  ** Please Note: Dragon 360 Dictation voice to text software may have been used in the creation of this document   **

## 2018-06-19 NOTE — DISCHARGE INSTRUCTIONS
Fingerstick checks 4 times daily - fasting, pre lunch, pre dinner and bedtime  Keep a log of these readings to present to her primary care physician on Friday  Diabetic Hyperglycemia   WHAT YOU NEED TO KNOW:   Diabetic hyperglycemia is a blood glucose (sugar) level that is higher than your healthcare provider recommends  You may have increased thirst and urinate more often than usual  Over time, uncontrolled diabetes can damage your nerves, blood vessels, tissues, and organs  That is why it is important to manage diabetic hyperglycemia  Without treatment, diabetic hyperglycemia can lead to diabetic ketoacidosis (DKA) or hyperglycemic hyperosmolar state (HHS)  These are serious conditions that can become life-threatening  DISCHARGE INSTRUCTIONS:   Seek care immediately if:   · You have shortness of breath  · Your breath smells fruity  · You have nausea and vomiting  · You have symptoms of dehydration, such as dark yellow urine, dry mouth and lips, and dry skin  Contact your healthcare provider if:   · You continue to have higher blood sugar levels than your healthcare provider recommends  · Your blood sugar level is over 240 mg/dl and  you have ketones in your urine  · You have questions or concerns about your condition or care  Medicines:   · Medicines  such as insulin and hypoglycemic medicine decrease blood sugar levels  · Take your medicine as directed  Contact your healthcare provider if you think your medicine is not helping or if you have side effects  Tell him or her if you are allergic to any medicine  Keep a list of the medicines, vitamins, and herbs you take  Include the amounts, and when and why you take them  Bring the list or the pill bottles to follow-up visits  Carry your medicine list with you in case of an emergency  Follow up with your healthcare provider or specialist as directed:   Your healthcare provider may refer you to a dietitian or diabetes specialist  Write down your questions so you remember to ask them during your visits  Manage diabetic hyperglycemia:   · If you take diabetes medicine or insulin, take it as directed  Missed or wrong doses can cause your blood sugar to go up  · Tell your healthcare provider if you continue to have trouble managing your blood sugar  He may change the type, amount, or timing of your diabetes medicine or insulin  If you do not take diabetes medicine or insulin, you may need to start  · Work with your healthcare provider to develop a sick day plan  Illness can cause your blood sugar to rise  A sick day plan helps you control your blood sugar level when you are sick  Prevent diabetic hyperglycemia:   · Check your blood sugar levels regularly  Ask your healthcare provider how often to check your blood sugar and what your levels should be  · Follow your meal plan  Your blood sugar can go up if you eat a large meal or you eat more carbohydrates than recommended  Work with a dietitian to develop a meal plan that is right for you  · Exercise regularly  to help lower your blood sugar when it is high  It can also keep your blood sugar levels steady over time  Exercise for at least 30 minutes, 5 days a week  Include muscle strengthening activities 2 days each week  Do not sit for longer than 90 minutes at a time  Work with your healthcare provider to create an exercise plan  Children should get at least 60 minutes of physical activity each day  · Check your ketones before exercise  if your blood sugar level is above 240 mg/dl  Do not exercise if you have ketones in your urine,  because your blood sugar level may rise even more  Ask your healthcare provider how to lower your blood sugar when you have ketones  © 2017 [address] [employer] Information is for End User's use only and may not be sold, redistributed or otherwise used for commercial purposes   All illustrations and images included in [employer] are the copyrighted property of [employer]  or [employer]  The above information is an  only  It is not intended as medical advice for individual conditions or treatments  Talk to your doctor, nurse or pharmacist before following any medical regimen to see if it is safe and effective for you  Type 2 Diabetes in Adults   WHAT YOU NEED TO KNOW:   Type 2 diabetes is a disease that affects how your body uses glucose (sugar)  Normally, when the blood sugar level increases, the pancreas makes more insulin  Insulin helps move sugar out of the blood so it can be used for energy  Type 2 diabetes develops because either the body cannot make enough insulin, or it cannot use the insulin correctly  After many years, your pancreas may stop making insulin  DISCHARGE INSTRUCTIONS:   Call 911 for any of the following:   · You have any of the following signs of a stroke:      ¨ Numbness or drooping on one side of your face     ¨ Weakness in an arm or leg    ¨ Confusion or difficulty speaking    ¨ Dizziness, a severe headache, or vision loss    · You have any of the following signs of a heart attack:      ¨ Squeezing, pressure, or pain in your chest that lasts longer than 5 minutes or returns    ¨ Discomfort or pain in your back, neck, jaw, stomach, or arm     ¨ Trouble breathing    ¨ Nausea or vomiting    ¨ Lightheadedness or a sudden cold sweat, especially with chest pain or trouble breathing  Seek care immediately if:   · You have severe abdominal pain, or the pain spreads to your back  You may also be vomiting  · You have trouble staying awake or focusing  · You are shaking or sweating  · You have blurred or double vision  · Your breath has a fruity, sweet smell  · Your breathing is deep and labored, or rapid and shallow  · Your heartbeat is fast and weak  Contact your healthcare provider if:   · You are vomiting or have diarrhea       · You have an upset stomach and cannot eat the foods on your meal plan  · You feel weak or more tired than usual      · You feel dizzy, have headaches, or are easily irritated  · Your skin is red, warm, dry, or swollen  · You have a wound that does not heal      · You have numbness in your arms or legs  · You have trouble coping with your illness, or you feel anxious or depressed  · You have questions or concerns about your condition or care  Medicines: You may  need any of the following:  · Hypoglycemic medicines or insulin  may be given to decrease the amount of sugar in your blood  · Blood pressure medicine  may be given to lower your blood pressure  Your blood pressure should be less than 140/90  · Cholesterol lowering medicine  may be given to prevent heart disease  · Antiplatelets , such as aspirin, help prevent blood clots  Take your antiplatelet medicine exactly as directed  These medicines make it more likely for you to bleed or bruise  If you are told to take aspirin, do not take acetaminophen or ibuprofen instead  · Take your medicine as directed  Contact your healthcare provider if you think your medicine is not helping or if you have side effects  Tell him or her if you are allergic to any medicine  Keep a list of the medicines, vitamins, and herbs you take  Include the amounts, and when and why you take them  Bring the list or the pill bottles to follow-up visits  Carry your medicine list with you in case of an emergency  Check your blood sugar level as directed: You will be taught how to use a glucose monitor  Ask your healthcare provider when and how often to check during the day  You will need to check your blood sugar level at least 3 times each day if you are on insulin  If you check your blood sugar level before a meal , it should be between 80 and 130 mg/dL  If you check your blood sugar level 1 to 2 hours after a meal , it should be less than 180 mg/dL   Ask your healthcare provider if these are good goals for you  Write down your results, and show them to your healthcare provider  He may use the results to make changes to your medicine, food, or exercise schedules  If your blood sugar level is too low: Your blood sugar level is too low if it goes below 70 mg/dL  If the level is too low, eat or drink 15 grams of fast-acting carbohydrate  These are found naturally in fruits  Fast-acting carbohydrates will raise your blood sugar level quickly  Examples of 15 grams of fast-acting carbohydrate are 4 ounces (½ cup) of fruit juice or 4 ounces of regular soda  Other examples are 2 tablespoons of raisins or 3 to 4 glucose tablets  Check your blood sugar level 15 minutes later  If the level is still low (less than 100 mg/dL), eat another 15 grams of carbohydrate  When the level returns to 100 mg/dL, eat a snack or meal that contains carbohydrates  This will help prevent another drop in blood sugar  Always carefully follow your healthcare provider's instructions on how to treat low blood sugar levels  Wear medical alert identification:  Wear medical alert jewelry or carry a card that says you have diabetes  Ask your healthcare provider where to get these items  Check your feet each day for sores:  Wear shoes and socks that fit correctly  Do not trim your toenails  Ask your healthcare provider for more information about foot care  Maintain a healthy weight:  Ask your healthcare provider how much you should weigh  A healthy weight can help you control your diabetes  Ask your provider to help you create a weight loss plan if you are overweight  Together you can set manageable weight loss goals  Follow your meal plan:  A dietitian will help you make a meal plan to keep your blood sugar level steady  Do not skip meals  Your blood sugar level may drop too low if you have taken diabetes medicine and do not eat    · Keep track of carbohydrates (sugar and starchy foods)  Your blood sugar level can get too high if you eat too many carbohydrates  Your dietitian will help you plan meals and snacks that have the right amount of carbohydrates  · Eat low-fat foods , such as skinless chicken and low-fat milk  · Eat less sodium (salt)  Limit high-sodium foods, such as soy sauce, potato chips, and soup  Do not add salt to food you cook  Limit your use of table salt  You should have less than 2,300 mg of sodium per day  · Eat high-fiber foods , such as vegetables, whole grain breads, and beans  · Limit alcohol  Alcohol affects your blood sugar level and can make it harder to manage your diabetes  Limit alcohol to 1 drink a day if you are a woman  Limit alcohol to 2 drinks a day if you are a man  A drink of alcohol is 12 ounces of beer, 5 ounces of wine, or 1½ ounces of liquor  Exercise as directed:  Exercise can help keep your blood sugar level steady, decrease your risk of heart disease, and help you lose weight  Stretch before and after you exercise  Exercise for at least 150 minutes every week  Spread this amount of exercise over at least 3 days a week  Do not skip exercise more than 2 days in a row  Include muscle strengthening activities 2 to 3 days each week  Older adults should include balance training 2 to 3 times each week  Activities that help increase balance include yoga and sabrina chi  Work with your healthcare provider to create an exercise plan  · Check your blood sugar level before and after exercise  Healthcare providers may tell you to change the amount of insulin you take or food you eat  If your blood sugar level is high, check your blood or urine for ketones before you exercise  Do not exercise if your blood sugar level is high and you have ketones  · If your blood sugar level is less than 100 mg/dL, have a carbohydrate snack before you exercise  Examples are 4 to 6 crackers, ½ banana, 8 ounces (1 cup) of milk, or 4 ounces (½ cup) of juice  Drink water or liquids that do not contain sugar before, during, and after exercise  Ask your dietitian or healthcare provider which liquids you should drink when you exercise  · Do not sit for longer than 30 minutes  If you cannot walk around, at least stand up  This will help you stay active and keep your blood circulating  Do not smoke:  Nicotine and other chemicals in cigarettes and cigars can cause lung damage and make it more difficult to manage your diabetes  Ask your healthcare provider for information if you currently smoke and need help to quit  Do not use e-cigarettes or smokeless tobacco in place of cigarettes or to help you quit  They still contain nicotine  Check your blood pressure as directed:  Ask your healthcare provider what your blood pressure should be  Most adults with diabetes and high blood pressure should have a systolic blood pressure (first number) less than 140  Your diastolic blood pressure (second number) should be less than 90  Ask about vaccines: You have a higher risk for serious illness if you get the flu, pneumonia, or hepatitis  Ask your healthcare provider if you should get a flu, pneumonia, or hepatitis B vaccine, and when to get the vaccine  Follow up with your healthcare provider as directed: You may need to return to have your A1c checked every 3 months  You will need to return at least once each year to have your feet checked  You will need an eye exam once a year to check for retinopathy  You will also need urine tests every year to check for kidney problems  You may need tests to monitor for heart disease such as an EKG, stress test, blood pressure monitoring, and blood tests  Write down your questions so you remember to ask them during your visits  © 2017 [address] [employer] Information is for End User's use only and may not be sold, redistributed or otherwise used for commercial purposes   All illustrations and images included in CareNotes® are the copyrighted property of [employer]  or [employer]  The above information is an  only  It is not intended as medical advice for individual conditions or treatments  Talk to your doctor, nurse or pharmacist before following any medical regimen to see if it is safe and effective for you  Hemoglobin A1c   WHAT YOU NEED TO KNOW:   What is a hemoglobin A1c test, and why do I need one? A hemoglobin A1c is a blood test that measures your average blood sugar level for the past 2 to 3 months  It is also called an HbA1c or glycohemoglobin test  An A1c test can help diagnose prediabetes or diabetes  It can also tell you how well your diabetes plan is working  A1c testing can help your healthcare provider make changes to your treatment plan  These changes can help improve or control your blood sugar levels  Good control of your blood sugar levels can decrease your risk for problems caused by diabetes  Examples include heart attack, stroke, blindness, kidney disease, and neuropathy (nerve problems)  What increases my risk for diabetes? You may need an A1c test if you have any of the following risk factors for diabetes:  · Heart disease    · High blood pressure    · Polycystic ovarian syndrome    · A first-degree relative with diabetes, such as a parent, brother, sister, or child    · Being overweight    · Lack of exercise or activity     · History of gestational diabetes and poor nutrition while pregnant  Who should get an A1c test?   · Adults who are overweight and have 1 or more risk factors for diabetes    · Adults 39years of age or older    · Children 7 to 25 years who are overweight and have 2 or more risk factors for diabetes    · Anyone with signs or symptoms of diabetes, such as increased thirst, slow-healing infections, or increased urination  What do the results of an A1c test mean? The results are given in percentages    · An A1c of 5 6% or lower means you do not have diabetes  · An A1c of 5 7% to 6 4% means you are at risk for diabetes  This is also called prediabetes  · An A1c of 6 5% or higher means you have diabetes  · If you currently have diabetes in good control, your A1c goal may be 7% or lower  Your healthcare provider will decide what your goal should be  This decision is based on your diabetes history and other medical conditions  You may need an A1c test 2 to 4 times each year, depending on your blood sugar level control  How should I prepare for the test?  You do not need to do anything to prepare for the test  Wear a short-sleeved or loose shirt to the test  This will make it easier to draw your blood  Other tests may be needed to diagnose or monitor diabetes if you have certain conditions  Tell your healthcare provider if you have any of the following:  · Iron-deficiency or [id number] anemia    · Cystic fibrosis    · Recent heavy blood loss or a blood transfusion    · Recent erythropoietin therapy    · Sickle cell disease or thalassemia    · Kidney failure or liver disease  What will happen after the test?  Schedule a follow-up appointment with your healthcare provider to talk about your test results  · If your A1c is lower than your goal , your medicines may be changed  · If your A1c is at your goal , you may not need any changes to your diabetes treatment plan  · If your A1c is higher than your goal , you may need changes to your medicines, eating plan, or exercise plan  What else should I know about an A1c test?   · You may get an estimated Average Glucose (eAG) with your A1c results  The eAG gives your A1c result in numbers like you see on your glucose meter  For example, an A1c of 6% will be reported as an eAG of 126 mg/dL  This means your average blood sugar level was 126 mg/dL over the last 2 to 3 months  The eAG can help you understand if your A1c result is on target for what your healthcare provider recommends       · You are at risk for an uncommon type of hemoglobin if you are , Mediterranean, or [facility] [place]  This type of hemoglobin can affect your A1c test results  Tell your healthcare provider if you come from any of these backgrounds  You may need to have your A1c sent to a lab with certain equipment  This will help make sure your results are accurate  CARE AGREEMENT:   You have the right to help plan your care  To help with this plan, you must learn about your lab tests  You can then discuss the results with your caregivers  Work with them to decide what care may be used to treat you  You always have the right to refuse treatment  The above information is an  only  It is not intended as medical advice for individual conditions or treatments  Talk to your doctor, nurse or pharmacist before following any medical regimen to see if it is safe and effective for you  © 2017 [address] [employer] Information is for End User's use only and may not be sold, redistributed or otherwise used for commercial purposes  All illustrations and images included in CareNotes® are the copyrighted property of [employer]  or [employer]  What is Insulin   WHAT YOU NEED TO KNOW:   Insulin is a hormone made by the pancreas  Insulin helps your body use sugar for energy  It removes sugar from your blood and helps lower your blood sugar levels  You may need to take insulin if your pancreas is not making enough  You may also need to take insulin if your body cannot use insulin correctly  DISCHARGE INSTRUCTIONS:   Contact your healthcare provider if:   · You have questions or concerns about your condition or care  Use insulin safely and check your blood sugar levels:   · Prepare and give insulin as directed  Make sure you prepare and use your insulin correctly  Give yourself insulin based on your blood sugar level and directions from your healthcare provider  Ask him if you have questions       · Check your blood sugar level at least 3 times each day  Some types of insulin can cause your blood sugar level to decrease quickly  Ask your healthcare provider when to check your blood sugar levels during the day  Check it any time you have symptoms of high or low blood sugar levels  · Know what your blood sugar levels should be  If you check your blood sugar level before a meal , it should be between 80 and 130 mg/dL  If you check your blood sugar level 1 to 2 hours after a meal , it should be less than 180 mg/dL  Ask your healthcare provider if these are good goals for you  · Write down your blood sugar level results  Bring the results to your follow-up appointments  Your healthcare provider may use the results to make changes to your insulin type or dose, eating plan, or exercise plan  · Ask your healthcare provider how to manage your diabetes during sick days  You may need different amounts of insulin during illness  You may also need different amounts during stress, travel, or when you change your diet or activity  How to store insulin:  Follow the storage directions on the label or package insert that came with the insulin  Do not use your insulin if there are clumps or color changes  Cloudy insulin that has small, white, particles that will not mix should be thrown away  Clear insulin that looks cloudy should be thrown away  · Always check the expiration date on your insulin bottle  Do not use insulin beyond its expiration date  · Do not store insulin in the freezer, direct sunlight, or the glove compartment of a car  Throw away insulin that has been frozen or exposed to very warm temperatures (above 85° F)  · You can store opened or unopened insulin in the refrigerator or at room temperature  If you store your insulin at room temperature, keep it in a cool, dry place  · If you travel, keep the insulin in a cool pack   This will help make sure the temperature of the insulin stays below 86° F (30° C)  Do not leave insulin in direct sunlight  Follow up with your healthcare provider as directed:  Write down your questions so you remember to ask them during your visits  © 2017 [address] [employer] Information is for End User's use only and may not be sold, redistributed or otherwise used for commercial purposes  All illustrations and images included in CareNotes® are the copyrighted property of [employer]  or [employer]  The above information is an  only  It is not intended as medical advice for individual conditions or treatments  Talk to your doctor, nurse or pharmacist before following any medical regimen to see if it is safe and effective for you  Basic Carbohydrate Counting   WHAT YOU NEED TO KNOW:   Carbohydrate counting is a way to plan your meals by counting the amount of carbohydrate in foods  Carbohydrates are the sugars, starches, and fiber found in fruit, grains, vegetables, and milk products  Carbohydrates increase your blood sugar levels  Carbohydrate counting can help you eat the right amount of carbohydrate to keep your blood sugar levels under control  DISCHARGE INSTRUCTIONS:   What you need to know about planning meals using carbohydrate counting:  · A dietitian or healthcare provider will help you develop a healthy meal plan that works best for you  You will be taught how much carbohydrate to eat or drink for each meal and snack  Your meal plan will be based on your age, weight, usual food intake, and physical activity level  If you have diabetes, it will also include your blood sugar levels and diabetes medicine  Once you know how much carbohydrate you should eat, you can decide what type of food you want to eat  · You will need to know what foods contain carbohydrate and how much they contain  Keep track of the amount of carbohydrate in meals and snacks in order to follow your meal plan  Do not avoid carbohydrates or skip meals   Your blood sugar may fall too low if you do not eat enough carbohydrate or you skip meals  Foods that contain carbohydrate:   · Breads:  Each serving of food listed below contains about 15 g of carbohydrate   ¨ 1 slice of bread (1 ounce) or 1 flour or corn tortilla (6 inch)    ¨ ½ of a hamburger bun or ¼ of a large bagel (about 1 ounce)    ¨ 1 pancake (about 4 inches across and ¼ inch thick)    · Cereals and grains:  Serving sizes of ready-to-eat cereals vary  Look at the serving size and the total carbohydrate amount listed on the food label  Each serving of food listed below contains about 15 g of carbohydrate   ¨ ¾ cup of dry, unsweetened, ready-to-eat cereal or ¼ cup of low-fat granola     ¨ ½ cup of oatmeal or other cooked cereal     ¨ ? cup of cooked rice or pasta    · Starchy vegetables and beans:  Each serving of food listed below contains about 15 g of carbohydrate   ¨ ½ cup of corn, green peas, sweet potatoes, or mashed potatoes    ¨ ¼ of a large baked potato    ¨ ½ cup of beans, lentils, and peas (garbanzo, kay, kidney, white, split, black-eyed)    · Crackers and snacks:  Each serving of food listed below contains about 15 g of carbohydrate   ¨ 3 pool cracker squares or 8 animal crackers     ¨ 6 saltine-type crackers    ¨ 3 cups of popcorn or ¾ ounce of pretzels, potato chips, or tortilla chips    · Fruit:  Each serving of food listed below contains about 15 g of carbohydrate   ¨ 1 small (4 ounce) piece of fresh fruit or ¾ to 1 cup of fresh fruit    ¨ ½ cup of canned or frozen fruit, packed in natural juice    ¨ ½ cup (4 ounces) of unsweetened fruit juice    ¨ 2 tablespoons of dried fruit    · Desserts or sugary foods:  Each serving of food listed below contains about 15 g of carbohydrate       ¨ 2-inch square unfrosted cake or brownie     ¨ 2 small cookies    ¨ ½ cup of ice cream, frozen yogurt, or nondairy frozen yogurt    ¨ ¼ cup of sherbet or sorbet    ¨ 1 tablespoon of regular syrup, jam, or jelly    ¨ 2 tablespoons of light syrup    · Milk and yogurt:  Foods from the milk group contain about 12 g of carbohydrate per serving  ¨ 1 cup of fat-free or low-fat milk    ¨ 1 cup of soy milk    ¨ ? cup of fat-free, yogurt sweetened with artificial sweetener    · Non-starchy vegetables:  Each serving contains about 5 g of carbohydrate   Three servings of non-starch vegetables count as 1 carbohydrate serving  ¨ ½ cup of cooked vegetables or 1 cup of raw vegetables  This includes beets, broccoli, cabbage, cauliflower, cucumber, mushrooms, tomatoes, and zucchini    ¨ ½ cup of vegetable juice  How to use carbohydrate counting to plan meals:   · Count carbohydrate amounts using serving sizes:      ¨ Pasta dinner example: You plan to have pasta, tossed salad, and an 8-ounce glass of milk  Your healthcare provider tells you that you may have 4 carbohydrate servings for dinner  One carbohydrate serving of pasta is ? cup  One cup of pasta will equal 3 carbohydrate servings  An 8-ounce glass of milk will count as 1 carbohydrate serving  These amounts of food would equal 4 carbohydrate servings  One cup of tossed salad does not count toward your carbohydrate servings  · Count carbohydrate amounts using food labels:  Find the total amount of carbohydrate in a packaged food by reading the food label  Food labels tell you the serving size of the food and the total carbohydrate amount in each serving  Find the serving size on the food label and then decide how many servings you will eat  Multiply the number of servings you plan to eat by the carbohydrate amount per serving  ¨ Granola bar snack example: Your meal plan allows you to have 2 carbohydrate servings (30 grams) of carbohydrate for a snack  You plan to eat 1 package of granola bars, which contains 2 bars  According to the food label, the serving size of food in this package is 1 bar  Each serving (1 bar) contains 25 grams of carbohydrate   The total amount of carbohydrate in this package of granola bars would be 50 g  Based on your meal plan, you should eat only 1 bar  Follow up with your healthcare provider as directed:  Write down your questions so you remember to ask them during your visits  © 2017 [address]  Information is for End User's use only and may not be sold, redistributed or otherwise used for commercial purposes  All illustrations and images included in CareNotes® are the copyrighted property of [employer]  or [employer]  The above information is an  only  It is not intended as medical advice for individual conditions or treatments  Talk to your doctor, nurse or pharmacist before following any medical regimen to see if it is safe and effective for you  How to Check Your Blood Sugar   WHAT YOU NEED TO KNOW:   High blood sugar levels increase your risk for heart attack, stroke, eye problems, and kidney problems  You can decrease your risk by controlling your blood sugar levels  Low blood sugar levels can also lead to serious health problems and must be treated right away  Check your blood sugar to help you learn how food, exercise, stress, and medicines affect your levels  Keep a record of your blood sugar levels  It can be used to adjust your meal plan, exercise routine, insulin doses, or diabetes medicine if needed  DISCHARGE INSTRUCTIONS:   How to check your blood sugar level:   · Check your blood sugar level with a glucose meter  This device uses a small drop of blood to measure your blood sugar level  Some glucose meters measure a drop of blood taken from your finger using a special lancet device  Other meters will also measure a drop of blood taken from your thigh, forearm, or the palm of your hand  · Blood sugar levels change quickly after meals, after you take insulin, during exercise, and when you feel stressed or ill   It is best to use blood from your finger to check your blood sugar level during these times  Your healthcare provider will teach you how to use a glucose meter to check your blood sugar level  Ask your healthcare provider for more information about taking blood samples from areas other than your finger  When and how often to check your blood sugar level:  Ask your healthcare provider when and how often you should check your blood sugar levels  If you check your blood sugar level before a meal , it should be between 80 and 130 mg/dL  If you check your blood sugar level 2 hours after a meal , it should be less than 180 mg/dL  Ask your healthcare provider if these are good goals for you  Blood sugar levels need to be checked more often when you are sick, there is a change to your medicine, or if you change your daily routine  Test your blood sugar level if you feel like it may be too high (hyperglycemia) or too low (hypoglycemia)  Keep a record of your blood sugar levels:  Write down your blood sugar level each time you test it  Write down the date, the time of the test (including if it was before or after a meal), and the result  Write down the time you took your insulin or diabetes pills  Record the type and amount of insulin or diabetes medicine you took  Write comments about anything that may have made your blood sugar level go up or down  Your blood sugar level can be affected by exercise, eating more or less than usual, or stress  Bring this record with you to your follow-up visits  How to care for your glucose meter and test strips:  Ask your healthcare provider how and how often to check the accuracy of your meter  You may need to do the following:  · Store your equipment properly  Keep the test strips away from heat, cold, and moisture  Do not take a test strip out of the container until you are ready to use it  Put the lid back tightly on the container  Do not use test strips that are damaged, wet, or bent      · Check the expiration date  on the test strip container to be sure the test strips have not   Your blood sugar readings may be wrong if you use  test strips  Use only the type of glucose test strips that work with your glucose meter  Wear medical alert identification:  Wear medical alert jewelry or carry a card that says you have diabetes  Ask your healthcare provider where to get these items  Follow up with your healthcare provider as directed:  Write down your questions so you remember to ask them during your visits  2017 [address] [facility] Information is for End User's use only and may not be sold, redistributed or otherwise used for commercial purposes  All illustrations and images included in CareNotes® are the copyrighted property of [employer]  or [employer]  The above information is an  only  It is not intended as medical advice for individual conditions or treatments  Talk to your doctor, nurse or pharmacist before following any medical regimen to see if it is safe and effective for you  Meal Planning with Diabetes Exchanges   WHAT YOU NEED TO KNOW:   Exchanges are servings of food that contain similar amounts of carbohydrate, fat, protein, and calories within a food group  The exchanges can be used to develop a healthy meal plan that helps to keep your blood sugar within the recommended levels  A meal plan with the right amount of carbohydrates is especially important  Your blood sugar naturally rises after you eat carbohydrates  Too many carbohydrates in 1 meal or snack can raise your blood sugar level  Carbohydrates are found in starches, fruit, milk, yogurt, and sweets  DISCHARGE INSTRUCTIONS:   How to create a meal plan with exchanges:  A dietitian will work with you to develop a healthy meal plan that is right for you  This meal plan will include the amount of exchanges you can have from each food group throughout the day   Follow your meal plan by keeping track of the amount of exchanges you eat for each meal and snack  Your meal plan will be based on your age, weight, blood sugar levels, medicine, and activity level  Starch food group exchanges:  Each exchange below contains about 15 grams of carbohydrate , 3 grams of protein, 1 gram of fat, and 80 calories  · 1 ounce of white, whole wheat or rye bread (1 slice)    · 1 ounce of bagel (about ¼ of a bagel)    · 1 6-inch flour or corn tortilla or 1 4-inch pancake (about ¼ inch thick)    · ? cup of cooked pasta or rice    · ¾ cup of dry, ready-to-eat cereal with no sugar added     · ½ cup of cooked cereal, such as oatmeal    · 3 pool cracker squares or 8 animal crackers    · 6 saltine-type crackers or     · 3 cups of popcorn or ¾ ounce of pretzels     · Starchy vegetables and cooked legumes:      ¨ ½ cup of corn, green peas, sweet potatoes, or mashed potatoes     ¨ ¼ of a large baked potato     ¨ 1 cup of acorn, butternut squash, or pumpkin     ¨ ½ cup of beans, lentils, or peas (such as kay, kidney, or black-eyed)    ¨ ? cup of lima beans  Fruit group exchanges:  Each exchange contains about 15 grams of carbohydrate  and 60 calories  · 1 small (4 ounce) apple, banana orange, or nectarine    · ½ cup of canned or fresh fruit    · ½ cup (4 ounces) of unsweetened fruit juice    · 2 tablespoons of dried fruit  Milk group exchanges:  Each exchange contains about 12 grams of carbohydrate  and 8 grams of protein  The amount of fat and calories in each serving depends on the type of milk (such as whole, low-fat, or fat-free)  · 1 cup fat-free or low-fat milk    · ¾ cup of plain, nonfat yogurt    · 1 cup fat-free, flavored yogurt with artificial (no calorie) sweetener  Non-starchy vegetable group exchanges:  Each exchange contains about 5 grams of carbohydrate , 2 grams of protein, and 25 calories  Examples include beets, broccoli, cabbage, carrots, cauliflower, cucumber, mushrooms, tomatoes, and zucchini    · ½ cup of cooked vegetables or 1 cup of raw vegetables     · ½ cup of vegetable juice  Meat and meat substitute group exchanges:  Each exchange of a lean meat  listed below contains about 7 grams of protein, 0 to 3 grams of fat, and 45 calories  The meat and meat substitutes food group does not contain any carbohydrates  Medium and high-fat meats have more calories  · 1 ounce of chicken or turkey without skin, or 1 ounce of fish (not breaded or fried)     · 1 ounce of lean beef, pork, or lamb     · 1-inch cube or 1 ounce of low-fat cheese     · 2 egg whites or ¼ cup of egg substitute     · ½ cup of tofu  Sweets, desserts, and other carbohydrate group exchanges:   · Sweets and other desserts:  Each exchange has about 15 grams of carbohydrate   ¨ 1 ounce of elo food cake or 2-inch square cake (unfrosted)    ¨ 2 small cookies     ¨ ½ cup of sugar-free, fat-free ice cream    ¨ 1 tablespoon of syrup, jam, jelly, table sugar, or honey    · Combination foods:     ¨ 1 cup of an entrée, such as lasagna, spaghetti with meatballs, macaroni and cheese, and chili with beans (each serving counts as 2 carbohydrate exchanges )     ¨ 1 cup of tomato or vegetable beef soup (each serving counts as 1 carbohydrate exchange )  Fat group exchanges:  Each exchange contains 5 grams of fat and 45 calories  · 1 teaspoon of oil (such as canola, olive, or corn oil)     · 6 almonds or cashews, 10 peanuts, or 4 pecan halves     · 2 tablespoons of avocado     · ½ tablespoon of peanut butter     · 1 teaspoon of regular margarine or 2 teaspoons of low-fat margarine     · 1 teaspoon of regular butter or 1 tablespoon of low-fat butter     · 1 teaspoon of regular mayonnaise or 1 tablespoon of low-fat mayonnaise     · 1 tablespoon of regular salad dressing or 2 tablespoons of low-fat salad dressing  Free foods: The foods on this list are called free foods because they have very few calories   Free foods usually do not increase your blood sugar if you limit them   · 1 tablespoon of catsup or taco sauce     · ¼ cup of salsa     · 2 tablespoons of sugar-free syrup or 2 teaspoons of light jam or jelly     · 1 tablespoon of fat-free salad dressing     · 4 tablespoons of fat-free margarine or fat-free mayonnaise     · Sugar-free drinks: diet soda, sugar-free drink mixes, or mineral water     · Low-sodium bouillon or fat-free broth     · Mustard     · Seasonings such as spices, herbs, and garlic     · Sugar-free gelatin without added fruit  Other healthy nutrition guidelines:   · Eat more fiber  Choose foods that are good sources of fiber, such as fruits, vegetables, and whole grains  Cereals that contain 5 or more grams of fiber per serving are good sources of fiber  Legumes such as garbanzo, kay beans, kidney beans, and lentils are also good sources  · Limit fat  Ask your dietitian or healthcare provider how much fat you should eat each day  Choose foods low in fat, saturated fat, trans fat, and cholesterol  Examples include turkey or chicken without the skin, fish, lean cuts of meat, and beans  Low-fat dairy foods, such as low-fat or fat-free milk and low-fat yogurt are also good choices  Omega-3 fatty acids are healthy fats that are found in canola oil, soybean oil and fatty fish  [place], albacore tuna, and sardines are good sources of omega 3 fatty acids  Eat 2 servings of these types of fish each week  Do not eat fried fish  · Limit sugar  Sugar and sweets must be counted toward the carbohydrate exchanges that you can have within your meal plan  Limit sugar and sweets because they are usually also high in calories and fat  Eat smaller portions of sweets by sharing a dessert or asking for a child-size portion at a restaurant  · Limit sodium  (salt) to about 2,300 mg per day  You may need to eat even less sodium if you have certain medical conditions  Foods high in sodium include soy sauce, potato chips, and soup  · Limit alcohol    Ask your healthcare provider if it is safe for you to drink alcohol  If alcohol is safe for you to have, eat a meal when you drink alcohol  If you drink alcohol on an empty stomach, your blood sugar may drop to a low level  Women should limit alcohol to 1 drink per day  Men should limit alcohol to 2 drinks per day  A drink of alcohol is 5 ounces of wine, 12 ounces of beer, or 1½ ounces of liquor  Other ways to manage your diabetes:   · Control your blood sugar level  Test your blood sugar level regularly and keep a record of the results  Ask your healthcare provider when and how often to test your blood sugar  You may need to check your blood sugar level at least 3 times each day  · Talk to your healthcare provider about your weight  Ask if you need to lose weight, and how much you need to lose  If you are overweight, you may need to make other changes to lose weight  Ask your healthcare provider to help you create a weight loss program      · Exercise  can help to control your blood sugar levels and decrease your risk of heart disease  It can also help you lose or maintain your weight  Get at least 30 minutes of exercise, 5 times each week  Do resistance training (using weights) 2 times each week  Do not sit for longer than 90 minutes  Work with your healthcare provider to plan the best exercise program for you  Contact your healthcare provider if:   · You have high blood sugar levels during a certain time of day, or almost all of the time  · You often have low blood sugar levels  · You have questions or concerns about your condition or care  © 2017 [address] [employer] Information is for End User's use only and may not be sold, redistributed or otherwise used for commercial purposes  All illustrations and images included in CareNotes® are the copyrighted property of [employer]  or [employer]  The above information is an  only   It is not intended as medical advice for individual conditions or treatments  Talk to your doctor, nurse or pharmacist before following any medical regimen to see if it is safe and effective for you  Insulin Pens   WHAT YOU NEED TO KNOW:   An insulin pen is a device used to inject insulin  The pen contains a cartridge of insulin  The pen may be reusable or disposable  You may need a different pen for each type of insulin you use  DISCHARGE INSTRUCTIONS:   Contact your healthcare provider if:   · You feel or see hard lumps in your skin where you inject your insulin  · You think you gave yourself too much or not enough insulin  · Your injections are very painful  · You see blood or clear fluid on your injection site more than once after you inject insulin  · You have questions about how to give the injection  · You cannot afford to buy your diabetes supplies  · You have questions or concerns about your condition or care  How to get the insulin ready to use:   · Check the label and color of the insulin  Check that you have the correct type and strength of insulin  Insulin pens are available as U100 and U500 strengths  Also check the expiration date on the label  Use a new cartridge or pen if the expiration date has passed  Short or rapid-acting insulin should be clear, colorless, and free of particles or clumps  Use a new cartridge or pen if the insulin does not look right  Follow the pen 's instructions for inserting a new cartridge into a reusable pen  · Mix cloudy insulin  Gently roll the pen back and forth between the palms of your hands  Repeat this 10 times  Do not shake the pen  This can make the insulin clump together  Next, gently tip the pen up and down 10 times  Do not use the insulin if there are clumps in it after you mix it  How to get the pen ready to use:   · Remove a new pen from the refrigerator 30 minutes before you use it  Insulin should be injected at room temperature  · Wash your hands    Use soap and water or an alcohol-based hand rub  This will help decrease your risk for an infection  · Remove the cap from the pen  Wipe the needle attachment area with an alcohol swab  · Attach a needle to the pen  Remove the tab from the needle  Do not remove the outer cap on the needle  Push the needle straight onto the pen  Turn the needle clockwise until you cannot turn it more  Make sure the needle is straight  · Remove the needle caps  Remove the outer cap and save  Remove the inner cap and throw it away  · Remove air from the pen  Air may cause pain during injection  Turn the dial to 2 units  For most insulin pens, you will hear a click for each unit of insulin that you dial  Hold the pen and point the needle up  Gently tap the pen to move air bubbles to the top of the pen  Press the injection button  You should see a drop of insulin on the tip of the pen  If you do not see a drop, change the needle and repeat this step  If you do not see a drop after you repeat this step 3 times, use a new pen  · Select the correct dose on the pen  Turn the dial to the number of units you need to inject  The pointer on the side of your pen should line up with your dose  The dial can be turned in either direction to choose the correct dose  You cannot choose a dose larger than the number of units left in the cartridge  Insert another cartridge or use another disposable pen if there is not enough insulin  Instead you can inject part of your dose with the insulin that is left  Next, you can use a new cartridge or pen to inject the rest of your dose  ·        Where to inject insulin:   · You can inject insulin into your abdomen, upper arm, buttocks, hip, and the front or side of the thigh  Insulin works fastest when it is injected into the abdomen  · Do not inject insulin into areas where you have a wound or bruising  Insulin injected into wounds or bruises may not get into your body correctly  · Use a different area within the site each time you inject insulin  For example, inject insulin into different areas in your abdomen  Insulin injected into the same area can cause lumps, swelling, or thickened skin  How to inject insulin with a pen:   · Clean the skin where you will inject the insulin  You can use an alcohol pad or a cotton swab dipped in alcohol  Let the area dry before you inject  This will decrease pain  · Grab a fold of your skin  Gently pinch the skin and fat between your thumb and first finger  · Insert the needle straight into your skin  Do not hold the syringe at an angle  Make sure the needle is all the way into the skin  Let go of the pinched tissue  · Push the injection button to inject the insulin  Continue to press on the injection button  Keep the needle in place for 10 seconds  · Pull out the needle  Replace the needle cap  Press on your injection site for 5 to 10 seconds  Do not rub  This will keep insulin from leaking out  · Remove the needle from the pen  Twist the capped needle counter clockwise  Place the needle in a heavy-duty laundry detergent bottle or a metal coffee can  The container should have a cap or lid that fits securely  · Replace the pen cap  Store the pen as directed  What to do with used needles:  Ask your local waste authority if you need to follow certain rules for getting rid of your needles  Bring your used needles home with you when you travel  Pack them in a plastic or metal container with a secure lid  How to store an insulin pen:  Do not store your pen with a needle attached  Follow the storage directions on the label or package insert that came with the insulin  Unopened pens can be stored in the refrigerator until you are ready to use them  Most insulin pens can be opened and kept at room temperature  Store your pen in a cool, dry place  Do not keep your pen in direct sunlight or in your car   Throw away pens that have been frozen or exposed to temperatures above 85° F (30° C)  If you travel, keep the pen in a cool pack  Follow up with your healthcare provider as directed:  Write down your questions so you remember to ask them during your visits  © 2017 [address] [employer] Information is for End User's use only and may not be sold, redistributed or otherwise used for commercial purposes  All illustrations and images included in CareNotes® are the copyrighted property of [employer] [facility] [facility]  or [employer]  The above information is an  only  It is not intended as medical advice for individual conditions or treatments  Talk to your doctor, nurse or pharmacist before following any medical regimen to see if it is safe and effective for you

## 2018-06-19 NOTE — PROGRESS NOTES
Progress Note - Cardiology   Mani Riley 70 y o  male MRN: [id number]  Unit/Bed#: -01 Encounter: [id number]    Assessment:  1  Indeterminate trop X 3  2  HTN   3  DM 2     Plan:  Stress test    Subjective/Objective   Chief Complaint: Pain in legs    Subjective: Glucose still high    Objective: No distress    Vitals: BP (!) 175/81 (BP Location: Left arm)   Pulse (!) 53   Temp 98 1 °F (36 7 °C) (Oral)   Resp 18   Ht 5' 4" (1 626 m)   Wt 79 kg (174 lb 2 6 oz)   SpO2 96%   BMI 29 90 kg/m²   Vitals:    06/16/18 2151 06/17/18 0600   Weight: 79 kg (174 lb 2 6 oz) 79 kg (174 lb 2 6 oz)     Orthostatic Blood Pressures      Most Recent Value   Blood Pressure   175/81 filed at 06/18/2018 1900   Patient Position - Orthostatic VS  Lying filed at 06/18/2018 1900            Intake/Output Summary (Last 24 hours) at 06/18/18 2025  Last data filed at 06/18/18 1144   Gross per 24 hour   Intake              310 ml   Output              550 ml   Net             -240 ml       Invasive Devices     Peripheral Intravenous Line            Peripheral IV 06/16/18 Right Antecubital 2 days                Review of Systems: Cardiovascular ROS: no chest pain or dyspnea on exertion    Physical Exam:   Neck: Normal range of motion  Neck supple  Cardiovascular: Normal rate, regular rhythm, normal heart sounds and intact distal pulses  Exam reveals no gallop and no friction rub  No murmur heard    Pulmonary/Chest: Effort normal and breath sounds normal      Lab Results:   CBC with diff:   Results from last 7 days  Lab Units 06/17/18  0445   WBC Thousand/uL 6 01   RBC Million/uL 5 04   HEMOGLOBIN g/dL 14 3   HEMATOCRIT % 40 9   MCV fL 81*   MCH pg 28 4   MCHC g/dL 35 0   RDW % 13 1   MPV fL 11 0   PLATELETS Thousands/uL 216     CMP:   Results from last 7 days  Lab Units 06/18/18  1159  06/16/18  1930   SODIUM mmol/L 138  < > 130*   POTASSIUM mmol/L 3 4*  < > 3 1*   CHLORIDE mmol/L 101  < > 95*   CO2 mmol/L 25  < > 27   ANION GAP mmol/L 12  < > 8   BUN mg/dL 16  < > 20   CREATININE mg/dL 0 97  < > 1 18   GLUCOSE RANDOM mg/dL 255*  < > 340*   CALCIUM mg/dL 9 1  < > 9 5   AST U/L  --   --  16   ALT U/L  --   --  41   ALK PHOS U/L  --   --  97   TOTAL PROTEIN g/dL  --   --  8 0   BILIRUBIN TOTAL mg/dL  --   --  0 60   EGFR ml/min/1 73sq m 82  < > 64   < > = values in this interval not displayed  Troponin:   0  Lab Value Date/Time   TROPONINI 0 13 (H) 06/17/2018 1452   TROPONINI 0 14 (H) 06/17/2018 1101   TROPONINI 0 13 (H) 06/17/2018 0445   TROPONINI 0 11 (H) 06/17/2018 0135   TROPONINI 0 09 (H) 06/16/2018 1930     BNP:   Results from last 7 days  Lab Units 06/18/18  1159   SODIUM mmol/L 138   POTASSIUM mmol/L 3 4*   CHLORIDE mmol/L 101   CO2 mmol/L 25   ANION GAP mmol/L 12   BUN mg/dL 16   CREATININE mg/dL 0 97   GLUCOSE RANDOM mg/dL 255*   CALCIUM mg/dL 9 1   EGFR ml/min/1 73sq m 82     Coags:     TSH:   Results from last 7 days  Lab Units 06/17/18  0135   TSH 3RD GENERATON uIU/mL 1 936     Magnesium:   Results from last 7 days  Lab Units 06/17/18  0445   MAGNESIUM mg/dL 1 8     Lipid Profile:   Results from last 7 days  Lab Units 06/17/18  0445   HDL mg/dL 31*   CHOLESTEROL mg/dL 151   LDL CALC mg/dL 89   TRIGLYCERIDES mg/dL 154*     Imaging: I have personally reviewed pertinent reports  EKG:   VTE Pharmacologic Prophylaxis:   VTE Mechanical Prophylaxis:     Counseling / Coordination of Care  Total time spent today 30 minutes  Greater than 50% of total time was spent with the patient and / or family counseling and / or coordination of care   A description of the counseling / coordination of care: 30

## 2022-06-01 ENCOUNTER — HOSPITAL ENCOUNTER (EMERGENCY)
Facility: HOSPITAL | Age: 67
Discharge: HOME | End: 2022-06-01
Attending: EMERGENCY MEDICINE
Payer: MEDICARE

## 2022-06-01 VITALS
SYSTOLIC BLOOD PRESSURE: 125 MMHG | WEIGHT: 178 LBS | RESPIRATION RATE: 70 BRPM | HEIGHT: 64 IN | BODY MASS INDEX: 30.39 KG/M2 | HEART RATE: 58 BPM | OXYGEN SATURATION: 97 % | DIASTOLIC BLOOD PRESSURE: 61 MMHG | TEMPERATURE: 97.8 F

## 2022-06-01 DIAGNOSIS — E86.0 DEHYDRATION: ICD-10-CM

## 2022-06-01 DIAGNOSIS — R53.83 FATIGUE: Primary | ICD-10-CM

## 2022-06-01 LAB
ALBUMIN SERPL BCP-MCNC: 4.7 G/DL (ref 3.5–5)
ALP SERPL-CCNC: 71 U/L (ref 46–116)
ALT SERPL W P-5'-P-CCNC: 27 U/L (ref 12–78)
ANION GAP SERPL CALCULATED.3IONS-SCNC: 11 MMOL/L (ref 4–13)
AST SERPL W P-5'-P-CCNC: 20 U/L (ref 5–45)
BACTERIA UR QL AUTO: NORMAL /HPF
BASOPHILS # BLD AUTO: 0.03 THOUSANDS/ΜL (ref 0–0.1)
BASOPHILS NFR BLD AUTO: 1 % (ref 0–1)
BILIRUB SERPL-MCNC: 1.19 MG/DL (ref 0.2–1)
BILIRUB UR QL STRIP: NEGATIVE
BUN SERPL-MCNC: 23 MG/DL (ref 5–25)
CALCIUM SERPL-MCNC: 9.6 MG/DL (ref 8.3–10.1)
CHLORIDE SERPL-SCNC: 101 MMOL/L (ref 100–108)
CLARITY UR: CLEAR
CO2 SERPL-SCNC: 26 MMOL/L (ref 21–32)
COLOR UR: YELLOW
CREAT SERPL-MCNC: 1.61 MG/DL (ref 0.6–1.3)
EOSINOPHIL # BLD AUTO: 0.16 THOUSAND/ΜL (ref 0–0.61)
EOSINOPHIL NFR BLD AUTO: 3 % (ref 0–6)
ERYTHROCYTE [DISTWIDTH] IN BLOOD BY AUTOMATED COUNT: 14.8 % (ref 11.6–15.1)
GFR SERPL CREATININE-BSD FRML MDRD: 42 ML/MIN/1.73SQ M
GLUCOSE SERPL-MCNC: 143 MG/DL (ref 65–140)
GLUCOSE UR STRIP-MCNC: NEGATIVE MG/DL
HCT VFR BLD AUTO: 47.7 % (ref 36.5–49.3)
HGB BLD-MCNC: 15.8 G/DL (ref 12–17)
HGB UR QL STRIP.AUTO: ABNORMAL
IMM GRANULOCYTES # BLD AUTO: 0.03 THOUSAND/UL (ref 0–0.2)
IMM GRANULOCYTES NFR BLD AUTO: 1 % (ref 0–2)
KETONES UR STRIP-MCNC: NEGATIVE MG/DL
LACTATE SERPL-SCNC: 1.1 MMOL/L (ref 0.5–2)
LEUKOCYTE ESTERASE UR QL STRIP: NEGATIVE
LIPASE SERPL-CCNC: 96 U/L (ref 73–393)
LYMPHOCYTES # BLD AUTO: 0.87 THOUSANDS/ΜL (ref 0.6–4.47)
LYMPHOCYTES NFR BLD AUTO: 13 % (ref 14–44)
MCH RBC QN AUTO: 28.5 PG (ref 26.8–34.3)
MCHC RBC AUTO-ENTMCNC: 33.1 G/DL (ref 31.4–37.4)
MCV RBC AUTO: 86 FL (ref 82–98)
MONOCYTES # BLD AUTO: 0.5 THOUSAND/ΜL (ref 0.17–1.22)
MONOCYTES NFR BLD AUTO: 8 % (ref 4–12)
NEUTROPHILS # BLD AUTO: 4.93 THOUSANDS/ΜL (ref 1.85–7.62)
NEUTS SEG NFR BLD AUTO: 74 % (ref 43–75)
NITRITE UR QL STRIP: NEGATIVE
NON-SQ EPI CELLS URNS QL MICRO: NORMAL /HPF
NRBC BLD AUTO-RTO: 0 /100 WBCS
PH UR STRIP.AUTO: 6 [PH]
PLATELET # BLD AUTO: 162 THOUSANDS/UL (ref 149–390)
PMV BLD AUTO: 11 FL (ref 8.9–12.7)
POTASSIUM SERPL-SCNC: 3.8 MMOL/L (ref 3.5–5.3)
PROT SERPL-MCNC: 8 G/DL (ref 6.4–8.2)
PROT UR STRIP-MCNC: NEGATIVE MG/DL
RBC # BLD AUTO: 5.55 MILLION/UL (ref 3.88–5.62)
RBC #/AREA URNS AUTO: NORMAL /HPF
SODIUM SERPL-SCNC: 138 MMOL/L (ref 136–145)
SP GR UR STRIP.AUTO: 1.01 (ref 1–1.03)
UROBILINOGEN UR QL STRIP.AUTO: 0.2 E.U./DL
WBC # BLD AUTO: 6.52 THOUSAND/UL (ref 4.31–10.16)
WBC #/AREA URNS AUTO: NORMAL /HPF

## 2022-06-01 PROCEDURE — 85025 COMPLETE CBC W/AUTO DIFF WBC: CPT

## 2022-06-01 PROCEDURE — 93005 ELECTROCARDIOGRAM TRACING: CPT

## 2022-06-01 PROCEDURE — 83690 ASSAY OF LIPASE: CPT

## 2022-06-01 PROCEDURE — 36415 COLL VENOUS BLD VENIPUNCTURE: CPT

## 2022-06-01 PROCEDURE — 96365 THER/PROPH/DIAG IV INF INIT: CPT

## 2022-06-01 PROCEDURE — 96366 THER/PROPH/DIAG IV INF ADDON: CPT

## 2022-06-01 PROCEDURE — 99283 EMERGENCY DEPT VISIT LOW MDM: CPT | Performed by: EMERGENCY MEDICINE

## 2022-06-01 PROCEDURE — 80053 COMPREHEN METABOLIC PANEL: CPT

## 2022-06-01 PROCEDURE — 83605 ASSAY OF LACTIC ACID: CPT

## 2022-06-01 PROCEDURE — 99285 EMERGENCY DEPT VISIT HI MDM: CPT

## 2022-06-01 PROCEDURE — 81001 URINALYSIS AUTO W/SCOPE: CPT | Performed by: EMERGENCY MEDICINE

## 2022-06-01 RX ADMIN — SODIUM CHLORIDE, SODIUM LACTATE, POTASSIUM CHLORIDE, AND CALCIUM CHLORIDE 1000 ML: .6; .31; .03; .02 INJECTION, SOLUTION INTRAVENOUS at 15:46

## 2022-06-01 RX ADMIN — SODIUM CHLORIDE, SODIUM LACTATE, POTASSIUM CHLORIDE, AND CALCIUM CHLORIDE 1000 ML: .6; .31; .03; .02 INJECTION, SOLUTION INTRAVENOUS at 17:18

## 2022-06-01 NOTE — ED PROVIDER NOTES
History  Chief Complaint   Patient presents with    Abdominal Pain    Weakness - Generalized     Patient c/o generalized weakness that started two weeks ago  Patient c/o diarrhea  Patient currently on antibiotic for cdiff  78-year-old male presents for generalized weakness  Patient states that he had a diarrheal illness starting about two weeks ago  I was seen by his primary care physician and was diagnosed with Clostridium difficile  He was started on oral vancomycin and completed the course a few days ago  States that his bowel movements are still somewhat soft but his diarrhea has improved significantly  He is here because he is generally weak  He is currently being treated for primary lung cancer with possible metastasis and his oncologist is concerned that he may be dehydrated  Patient denies any significant chest pain, shortness of breath, abdominal pain, nausea, or vomiting  States that he has been able to tolerate liquids and has been able to tolerate solids better over the past few days  Just feels weak when he gets up and walks around  History provided by:  Patient  Fatigue  Severity:  Moderate  Onset quality:  Gradual  Timing:  Constant  Progression:  Waxing and waning  Chronicity:  New  Relieved by:  Nothing  Worsened by:  Nothing  Ineffective treatments:  None tried  Associated symptoms: no abdominal pain, no arthralgias, no chest pain, no cough, no dysuria, no fever, no seizures, no shortness of breath and no vomiting        Prior to Admission Medications   Prescriptions Last Dose Informant Patient Reported? Taking?    Insulin Pen Needle 33G X 4 MM MISC   No No   Si Package by Does not apply route 3 (three) times a day for 30 days   amLODIPine (NORVASC) 5 mg tablet  Self Yes No   Sig: Take 10 mg by mouth daily     aspirin (ECOTRIN LOW STRENGTH) 81 mg EC tablet   Yes No   Sig: Take 81 mg by mouth daily   hydrochlorothiazide (HYDRODIURIL) 25 mg tablet   Yes No   Sig: Take 25 mg by mouth daily   insulin glargine (LANTUS SOLOSTAR) 100 units/mL injection pen   No No   Sig: Inject 20 Units under the skin daily at bedtime   metFORMIN (GLUCOPHAGE) 1000 MG tablet   No No   Sig: Take 1 tablet (1,000 mg total) by mouth 2 (two) times a day with meals for 30 days   potassium chloride (K-DUR,KLOR-CON) 20 mEq tablet   No No   Sig: Take 1 tablet (20 mEq total) by mouth daily for 30 days   quinapril (ACCUPRIL) 10 mg tablet  Self Yes No   Sig: Take 10 mg by mouth daily      Facility-Administered Medications: None       Past Medical History:   Diagnosis Date    Diabetes mellitus ([facility] )     High blood pressure     Hypertension     Lung cancer ([facility] )     Lung disorder     Urinary problem        Past Surgical History:   Procedure Laterality Date    CHOLECYSTECTOMY      LUNG CANCER SURGERY  05/2019    partial lung removed        Family History   Family history unknown: Yes     I have reviewed and agree with the history as documented  E-Cigarette/Vaping    E-Cigarette Use Never User      E-Cigarette/Vaping Substances     Social History     Tobacco Use    Smoking status: Never Smoker    Smokeless tobacco: Never Used   Vaping Use    Vaping Use: Never used   Substance Use Topics    Alcohol use: No    Drug use: No       Review of Systems   Constitutional: Positive for fatigue  Negative for chills and fever  HENT: Negative for ear pain and sore throat  Eyes: Negative for pain and visual disturbance  Respiratory: Negative for cough and shortness of breath  Cardiovascular: Negative for chest pain and palpitations  Gastrointestinal: Negative for abdominal pain and vomiting  Genitourinary: Negative for dysuria and hematuria  Musculoskeletal: Negative for arthralgias and back pain  Skin: Negative for color change and rash  Neurological: Negative for seizures and syncope  All other systems reviewed and are negative  Physical Exam  Physical Exam  Vitals and nursing note reviewed  Constitutional:       Appearance: He is well-developed  HENT:      Head: Normocephalic and atraumatic  Eyes:      Conjunctiva/sclera: Conjunctivae normal    Cardiovascular:      Rate and Rhythm: Normal rate and regular rhythm  Heart sounds: No murmur heard  Pulmonary:      Effort: Pulmonary effort is normal  No respiratory distress  Breath sounds: Normal breath sounds  Abdominal:      Palpations: Abdomen is soft  Tenderness: There is no abdominal tenderness  Musculoskeletal:      Cervical back: Neck supple  Skin:     General: Skin is warm and dry  Capillary Refill: Capillary refill takes less than 2 seconds  Neurological:      General: No focal deficit present  Mental Status: He is alert and oriented to person, place, and time           Vital Signs  ED Triage Vitals [06/01/22 1115]   Temperature Pulse Respirations Blood Pressure SpO2   97 8 °F (36 6 °C) 60 20 131/71 98 %      Temp Source Heart Rate Source Patient Position - Orthostatic VS BP Location FiO2 (%)   Oral Monitor Sitting Left arm --      Pain Score       --           Vitals:    06/01/22 1115 06/01/22 1515 06/01/22 1648   BP: 131/71 142/68 125/61   Pulse: 60  60   Patient Position - Orthostatic VS: Sitting  Lying         Visual Acuity      ED Medications  Medications   lactated ringers bolus 1,000 mL (1,000 mL Intravenous New Bag 6/1/22 1718)   lactated ringers bolus 1,000 mL (0 mL Intravenous Stopped 6/1/22 1646)       Diagnostic Studies  Results Reviewed     Procedure Component Value Units Date/Time    Urine Microscopic [[id number]]  (Normal) Collected: 06/01/22 1652    Lab Status: Final result Specimen: Urine, Clean Catch Updated: 06/01/22 1712     RBC, UA None Seen /hpf      WBC, UA None Seen /hpf      Epithelial Cells None Seen /hpf      Bacteria, UA None Seen /hpf     UA (URINE) with reflex to Scope [[id number]]  (Abnormal) Collected: 06/01/22 1652    Lab Status: Final result Specimen: Urine, Clean Catch Updated: 06/01/22 1700     Color, UA Yellow     Clarity, UA Clear     Specific Gravity, UA 1 010     pH, UA 6 0     Leukocytes, UA Negative     Nitrite, UA Negative     Protein, UA Negative mg/dl      Glucose, UA Negative mg/dl      Ketones, UA Negative mg/dl      Urobilinogen, UA 0 2 E U /dl      Bilirubin, UA Negative     Blood, UA Trace-Intact    Lactic acid, plasma [[id number]]  (Normal) Collected: 06/01/22 1318    Lab Status: Final result Specimen: Blood from Arm, Right Updated: 06/01/22 1401     LACTIC ACID 1 1 mmol/L     Narrative:      Result may be elevated if tourniquet was used during collection      Comprehensive metabolic panel [[id number]]  (Abnormal) Collected: 06/01/22 1318    Lab Status: Final result Specimen: Blood from Arm, Right Updated: 06/01/22 1356     Sodium 138 mmol/L      Potassium 3 8 mmol/L      Chloride 101 mmol/L      CO2 26 mmol/L      ANION GAP 11 mmol/L      BUN 23 mg/dL      Creatinine 1 61 mg/dL      Glucose 143 mg/dL      Calcium 9 6 mg/dL      AST 20 U/L      ALT 27 U/L      Alkaline Phosphatase 71 U/L      Total Protein 8 0 g/dL      Albumin 4 7 g/dL      Total Bilirubin 1 19 mg/dL      eGFR 42 ml/min/1 73sq m     Narrative:      [place] guidelines for Chronic Kidney Disease (CKD):     Stage 1 with normal or high GFR (GFR > 90 mL/min/1 73 square meters)    Stage 2 Mild CKD (GFR = 60-89 mL/min/1 73 square meters)    Stage 3A Moderate CKD (GFR = 45-59 mL/min/1 73 square meters)    Stage 3B Moderate CKD (GFR = 30-44 mL/min/1 73 square meters)    Stage 4 Severe CKD (GFR = 15-29 mL/min/1 73 square meters)    Stage 5 End Stage CKD (GFR <15 mL/min/1 73 square meters)  Note: GFR calculation is accurate only with a steady state creatinine    Lipase [[id number]]  (Normal) Collected: 06/01/22 1318    Lab Status: Final result Specimen: Blood from Arm, Right Updated: 06/01/22 1356     Lipase 96 u/L     CBC and differential [[id number]]  (Abnormal) Collected: 06/01/22 1318 Lab Status: Final result Specimen: Blood from Arm, Right Updated: 06/01/22 1331     WBC 6 52 Thousand/uL      RBC 5 55 Million/uL      Hemoglobin 15 8 g/dL      Hematocrit 47 7 %      MCV 86 fL      MCH 28 5 pg      MCHC 33 1 g/dL      RDW 14 8 %      MPV 11 0 fL      Platelets 413 Thousands/uL      nRBC 0 /100 WBCs      Neutrophils Relative 74 %      Immat GRANS % 1 %      Lymphocytes Relative 13 %      Monocytes Relative 8 %      Eosinophils Relative 3 %      Basophils Relative 1 %      Neutrophils Absolute 4 93 Thousands/µL      Immature Grans Absolute 0 03 Thousand/uL      Lymphocytes Absolute 0 87 Thousands/µL      Monocytes Absolute 0 50 Thousand/µL      Eosinophils Absolute 0 16 Thousand/µL      Basophils Absolute 0 03 Thousands/µL                  No orders to display              Procedures  Procedures         ED Course  ED Course as of 06/01/22 1839 Wed Jun 01, 2022   1618 I had discussion with patient and his daughter over the telephone  Patient was diagnosed with Clostridium difficile by his PCP and has completed a course of oral vancomycin  His diarrhea is improving but he continues to feel weak and at times has been "mentally foggy" both patient and daughter deny any localizing will her/lateralizing neurologic symptoms  He is currently being treated for primary lung cancer with some metastasis  He has a follow-up appointment with Oncology and has imaging scheduled within the next two weeks  His oncologist recommended that he come to the ER for potential IV hydration  We did discuss the fact that he has some acute kidney injury on his lab work today but otherwise his labs exam are reassuring  He is not having any pain  Discussed options for admission for hydration verses aggressive hydration here in the emergency department  Patient has no history of heart failure, patient and family would like to avoid hospitalization if possible    Will trial 2 L IV fluid in ED and likely discharge with caveats that patient should return if his symptoms worsen or do not improve  1737 EKG reviewed by me, mild sinus bradycardia at rate of 56, normal axis, normal intervals, no acute ST changes to suggest cardiac ischemia  When compared to previous EKG from June of 2018, no significant change was found  MDM  Number of Diagnoses or Management Options  Dehydration: new and requires workup  Fatigue: new and requires workup  Diagnosis management comments: 57-year-old male presented essentially for generalized weakness  He did have a diarrheal illness that started about two weeks ago  He completed a course of antibiotics for Clostridium difficile and his diarrhea has improved but he was sent to ED due to concerns for dehydration  Patient does have a mild to moderate increase in serum creatinine, most likely pre renal, which makes is consistent with his reported history of recent severe diarrhea  Discussed with patient and his daughter  Patient has also seemed "a little slower with comprehension" at times per family though patient and family deny any overt alteration in mental status, no localized or lateralizing neuro symptoms  I did offer CT head given patient's history of lung CA but patient and daughter state that he does have surveillance imaging scheduled with his oncologist within the next week  We also discussed risks and benefits of IV hydration in the ED versus admission for IV hydration  Patient and daughter would like to avoid hospitalization if possible  He is not having any fever, chest pain, or shortness of breath  We will administer 2 L IV fluid for rehydration and reassessed  As long as patient is feeling okay we will likely discharge to follow-up with his personal PCP and oncologist with the caveats that he may return to the ED at any time for further eval and treatment         Amount and/or Complexity of Data Reviewed  Clinical lab tests: reviewed and ordered  Tests in the radiology section of CPT®: reviewed and ordered  Review and summarize past medical records: yes  Independent visualization of images, tracings, or specimens: yes        Disposition  Final diagnoses:   Fatigue   Dehydration     Time reflects when diagnosis was documented in both MDM as applicable and the Disposition within this note     Time User Action Codes Description Comment    6/1/2022  5:30 PM Nestora Raider Add [R53 1] Generalized weakness     6/1/2022  5:30 PM Nestora Raider Remove [R53 1] Generalized weakness     6/1/2022  5:30 PM Nestora Raider Add [R53 83] Fatigue     6/1/2022  5:30 PM Nestora Raider Add [E86 0] Dehydration       ED Disposition     ED Disposition   Discharge    Condition   Stable    Date/Time   Wed Jun 1, 2022  5:30 PM    Comment   Zack Elaine discharge to home/self care  Follow-up Information     Follow up With Specialties Details Why Contact Info Additional Merary Torres [facility] Gastroenterology   [phone] [phone]  [phone] Sheri Cash [facility], [address] [place] [address] [address], [facility], [facility], [address] [facility], [facility], Nurse Practitioner   [address] [place] [address] Level,  [place] [address]  [phone]             Patient's Medications   Discharge Prescriptions    No medications on file       No discharge procedures on file      PDMP Review     None          ED Provider  Electronically Signed by           Mariaelena Alvarez MD  06/01/22 3684

## 2022-06-03 LAB
ATRIAL RATE: 56 BPM
P AXIS: 67 DEGREES
PR INTERVAL: 142 MS
QRS AXIS: 9 DEGREES
QRSD INTERVAL: 82 MS
QT INTERVAL: 452 MS
QTC INTERVAL: 436 MS
T WAVE AXIS: 1 DEGREES
VENTRICULAR RATE: 56 BPM

## 2022-06-03 PROCEDURE — 93010 ELECTROCARDIOGRAM REPORT: CPT | Performed by: INTERNAL MEDICINE

## 2024-01-11 LAB — HBA1C MFR BLD HPLC: 6.4 %

## 2024-02-29 ENCOUNTER — OFFICE VISIT (OUTPATIENT)
Dept: FAMILY MEDICINE CLINIC | Facility: CLINIC | Age: 69
End: 2024-02-29
Payer: MEDICARE

## 2024-02-29 VITALS
SYSTOLIC BLOOD PRESSURE: 138 MMHG | DIASTOLIC BLOOD PRESSURE: 78 MMHG | HEIGHT: 64 IN | OXYGEN SATURATION: 99 % | HEART RATE: 54 BPM | RESPIRATION RATE: 18 BRPM | TEMPERATURE: 97.8 F | BODY MASS INDEX: 28.54 KG/M2 | WEIGHT: 167.2 LBS

## 2024-02-29 DIAGNOSIS — E11.40 TYPE 2 DIABETES MELLITUS WITH DIABETIC NEUROPATHY, WITHOUT LONG-TERM CURRENT USE OF INSULIN (HCC): ICD-10-CM

## 2024-02-29 DIAGNOSIS — C34.92 MALIGNANT NEOPLASM OF LEFT LUNG, UNSPECIFIED PART OF LUNG (HCC): ICD-10-CM

## 2024-02-29 DIAGNOSIS — E88.09 HYPOALBUMINEMIA: ICD-10-CM

## 2024-02-29 DIAGNOSIS — R60.0 BILATERAL LEG EDEMA: ICD-10-CM

## 2024-02-29 DIAGNOSIS — E55.9 VITAMIN D DEFICIENCY: ICD-10-CM

## 2024-02-29 DIAGNOSIS — I10 PRIMARY HYPERTENSION: Primary | ICD-10-CM

## 2024-02-29 DIAGNOSIS — I82.4Y2 ACUTE DEEP VEIN THROMBOSIS (DVT) OF PROXIMAL VEIN OF LEFT LOWER EXTREMITY (HCC): ICD-10-CM

## 2024-02-29 DIAGNOSIS — D69.6 THROMBOCYTOPENIA (HCC): ICD-10-CM

## 2024-02-29 PROBLEM — E11.9 TYPE 2 DIABETES MELLITUS, WITHOUT LONG-TERM CURRENT USE OF INSULIN (HCC): Status: ACTIVE | Noted: 2018-06-18

## 2024-02-29 PROBLEM — I82.402 ACUTE DEEP VEIN THROMBOSIS (DVT) OF LEFT LOWER EXTREMITY (HCC): Status: ACTIVE | Noted: 2024-02-29

## 2024-02-29 PROBLEM — I82.401 ACUTE DEEP VEIN THROMBOSIS (DVT) OF RIGHT LOWER EXTREMITY (HCC): Status: ACTIVE | Noted: 2024-02-29

## 2024-02-29 PROBLEM — E11.9 NEW ONSET TYPE 2 DIABETES MELLITUS (HCC): Status: RESOLVED | Noted: 2018-06-19 | Resolved: 2024-02-29

## 2024-02-29 PROBLEM — E87.6 HYPOKALEMIA: Status: RESOLVED | Noted: 2018-06-16 | Resolved: 2024-02-29

## 2024-02-29 LAB — SL AMB POCT HEMOGLOBIN AIC: 5.8 (ref ?–6.5)

## 2024-02-29 PROCEDURE — 99204 OFFICE O/P NEW MOD 45 MIN: CPT | Performed by: FAMILY MEDICINE

## 2024-02-29 PROCEDURE — 83036 HEMOGLOBIN GLYCOSYLATED A1C: CPT | Performed by: FAMILY MEDICINE

## 2024-02-29 RX ORDER — TEPOTINIB HYDROCHLORIDE 225 MG/1
225 TABLET ORAL DAILY
COMMUNITY

## 2024-02-29 RX ORDER — TORSEMIDE 10 MG/1
10 TABLET ORAL DAILY
Qty: 30 TABLET | Refills: 5 | Status: SHIPPED | OUTPATIENT
Start: 2024-02-29

## 2024-02-29 RX ORDER — HYDRALAZINE HYDROCHLORIDE 50 MG/1
50 TABLET, FILM COATED ORAL 3 TIMES DAILY
COMMUNITY
Start: 2024-01-11

## 2024-02-29 RX ORDER — RIVAROXABAN 15 MG/1
20 TABLET, FILM COATED ORAL
COMMUNITY
Start: 2023-12-16

## 2024-02-29 NOTE — PROGRESS NOTES
Name: Andrea Wiley      : 1955      MRN: [id number]  Encounter Provider: Azalia Muñoz MD  Encounter Date: 2024   Encounter department: [facility]  Chief Complaint   Patient presents with    New Patient Visit     Health Maintenance   Topic Date Due    Hepatitis C Screening  Never done    Medicare Annual Wellness Visit (AWV)  Never done    Kidney Health Evaluation: Albumin/Creatinine Ratio  Never done    Pneumococcal Vaccine: 65+ Years (1 of 2 - PCV) Never done    Diabetic Foot Exam  Never done    DM Eye Exam  Never done    Colorectal Cancer Screening  Never done    Zoster Vaccine (1 of 2) Never done    Kidney Health Evaluation: GFR  2023    Influenza Vaccine (1) Never done    COVID-19 Vaccine (4 - 2023-24 season) 2023    HEMOGLOBIN A1C  2024    Fall Risk  2025    Depression Screening  2025    HIB Vaccine  Aged Out    IPV Vaccine  Aged Out    Hepatitis A Vaccine  Aged Out    Meningococcal ACWY Vaccine  Aged Out    HPV Vaccine  Aged Out     Assessment & Plan     1. Primary hypertension  Assessment & Plan:  BP at home ranges 115/70's.  Currently taking HCTZ 25 mg daily.    Patient has bilateral leg edema.    Will d/c HCTZ and start Torsemide 10 mg daily.  Follow a low sodium diet.  Check BMP in 10-14 days.    Monitor BP at home and call with BP log in 7-10 days.        Orders:  -     Comprehensive metabolic panel; Future; Expected date: 2024  -     Basic metabolic panel; Future; Expected date: 2024    2. Type 2 diabetes mellitus with diabetic neuropathy, without long-term current use of insulin ([facility])  Assessment & Plan:    Lab Results   Component Value Date    HGBA1C 5.8 2024     Blood sugar at home ranges 110 -140's.    Patient is no longer on Insulin therapy.    Takes Metformin 1000 mg -1/2 tablet when blood sugar goes up.    Recommended to schedule eye exam with ophthalmologist.      Orders:  -     POCT hemoglobin A1c  -      Comprehensive metabolic panel; Future; Expected date: 03/07/2024  -     Albumin / creatinine urine ratio; Future  -     Ambulatory Referral to Ophthalmology; Future    3. Malignant neoplasm of left lung, unspecified part of lung (HCC)  Assessment & Plan:  Patient was diagnosed with lung adenocarcinoma in 1/2019, S/P ARMEN lobectomy, with en- bloc left lower lobe wedge resection.  Developed 3 brain metastases in November 2021, S/P craniotomy with frontal lesion excision in June 2022.  Followed by oncology Dr. Paulino at [facility] [place] every 3 months.  Currently taking Tepmetko and Tagrisso daily.        4. Vitamin D deficiency  Assessment & Plan:  Vit D 25 OH 22.1.  Start Vit D 5000 IU daily.        5. Thrombocytopenia (HCC)  Assessment & Plan:  Blood test done in January 2024 in [place] showed platelet count of 97,000.  Currently on chemotherapy for lung CA.        6. Bilateral leg edema  Assessment & Plan:  Recommended to follow a low-sodium diet.    D/C HCTZ.    Start Torsemide 10 mg daily.    Recommended to keep legs elevated throughout the day.      Orders:  -     torsemide (DEMADEX) 10 mg tablet; Take 1 tablet (10 mg total) by mouth daily  -     Basic metabolic panel; Future; Expected date: 03/07/2024    7. Acute deep vein thrombosis (DVT) of proximal vein of left lower extremity (HCC)  Assessment & Plan:  Patient was diagnosed with DVT L LE 3 months ago.    Currently on Xarelto 20 mg daily.      8. Hypoalbuminemia  Assessment & Plan:  Serum Albumin 2.9.  Recommended to start Protein boost or Ensure daily.            Depression Screening and Follow-up Plan: Patient was screened for depression during today's encounter. They screened negative with a PHQ-2 score of 0.      Schedule follow-up visit in 6 weeks.      Subjective      HPI    New patient presents to establish medical care accompanied by his wife.    He lives in [facility].    Followed by PCP Dr. Bello in [place].    PMHx: HTN, Type 2  DM, Vit D deficiency, lung adenocarcinoma diagnosed in 1/2019, S/P ARMEN lobectomy, with en- bloc left lower lobe wedge resection, developed 3 brain metastases in November 2021.  S/P craniotomy with frontal lesion excision in June 2022, DVT L LE, BPH.    Patient has been followed by oncology Dr. Paulino at [facility] [place] every 3 months.  Currently on chemotherapy.    Reviewed current medications, blood test results from January 11, 2024 performed in [place].    Hb 12.9, platelet count  97,000. TSH 1.680, PSA 0.54.    Vit D 25-hydroxy 22.1, Vit B12 430, AST, ALT- within normal range.    Total cholesterol 123, triglycerides 60, LDL direct 74.  Albumin 2.9.  Glucose 159, potassium 3.8, creatinine 1.35.    HTN - patient takes HCTZ 25 mg daily.  BP remains stable.  Denies chest pain, shortness of breath, dizziness.    Patient walks daily.    C/o swelling of left hand, BL leg edema.  Patient states that was told by oncology that hand swelling and leg edema could be related to chemotherapy.    Type 2 DM - blood sugar at home ranges 110 - 140's.    Reports no hypoglycemic episodes.  He is no longer on Insulin therapy.  Takes Metformin 1000 mg -1/2 tab. when blood sugar goes up.      Patient has history of DVT L LE diagnosed 3 months ago.  Currently on Xarelto 20 mg daily.    Denies tobacco, alcohol use.    Review of Systems   Constitutional:  Positive for appetite change (decreased) and fatigue. Negative for activity change, chills and fever.   HENT:  Negative for congestion, ear pain, hearing loss, sore throat and trouble swallowing.    Eyes:  Negative for pain, discharge, redness, itching and visual disturbance.   Respiratory:  Negative for cough, chest tightness, shortness of breath and wheezing.    Cardiovascular:  Positive for leg swelling. Negative for chest pain and palpitations.   Gastrointestinal:  Negative for abdominal pain, blood in stool, constipation, diarrhea, nausea and vomiting.    Genitourinary:  Negative for difficulty urinating, dysuria and hematuria.   Musculoskeletal:  Negative for back pain, gait problem and joint swelling.   Skin:  Negative for rash.   Neurological:  Positive for numbness (in feet). Negative for dizziness, syncope and headaches.   Psychiatric/Behavioral:  Negative for dysphoric mood and sleep disturbance. The patient is not nervous/anxious.        Current Outpatient Medications on File Prior to Visit   Medication Sig    hydrochlorothiazide (HYDRODIURIL) 25 mg tablet Take 25 mg by mouth daily    metFORMIN (GLUCOPHAGE) 1000 MG tablet Take 1 tablet (1,000 mg total) by mouth 2 (two) times a day with meals for 30 days (Patient taking differently: Take 1,000 mg by mouth 1/2 tab. daily PRN)    Osimertinib Mesylate 80 MG TABS Take 80 mg by mouth in the morning    Tepotinib HCl (Tepmetko) 225 MG TABS Take 225 mg by mouth in the morning    Xarelto 15 MG tablet Take 20 mg by mouth daily with breakfast    hydrALAZINE (APRESOLINE) 50 mg tablet Take 50 mg by mouth 3 (three) times a day (Patient not taking: Reported on 2/29/2024)    [DISCONTINUED] amLODIPine (NORVASC) 5 mg tablet Take 10 mg by mouth daily   (Patient not taking: Reported on 2/29/2024)    [DISCONTINUED] aspirin (ECOTRIN LOW STRENGTH) 81 mg EC tablet Take 81 mg by mouth daily (Patient not taking: Reported on 2/29/2024)    [DISCONTINUED] insulin glargine (LANTUS SOLOSTAR) 100 units/mL injection pen Inject 20 Units under the skin daily at bedtime (Patient not taking: Reported on 2/29/2024)    [DISCONTINUED] Insulin Pen Needle 33G X 4 MM MISC 1 Package by Does not apply route 3 (three) times a day for 30 days    [DISCONTINUED] potassium chloride (K-DUR,KLOR-CON) 20 mEq tablet Take 1 tablet (20 mEq total) by mouth daily for 30 days    [DISCONTINUED] quinapril (ACCUPRIL) 10 mg tablet Take 10 mg by mouth daily (Patient not taking: Reported on 2/29/2024)       Objective     /78 (BP Location: Left arm, Patient Position:  "Sitting, Cuff Size: Standard)   Pulse (!) 54   Temp 97.8 °F (36.6 °C) (Tympanic)   Resp 18   Ht 5' 4\" (1.626 m)   Wt 75.8 kg (167 lb 3.2 oz)   SpO2 99%   BMI 28.70 kg/m²     Physical Exam  Vitals and nursing note reviewed.   Constitutional:       Appearance: Normal appearance.      Comments: Overweight   HENT:      Head: Normocephalic and atraumatic.   Eyes:      Conjunctiva/sclera: Conjunctivae normal.      Pupils: Pupils are equal, round, and reactive to light.   Cardiovascular:      Rate and Rhythm: Regular rhythm. Bradycardia present.      Pulses: Pulses are weak.           Dorsalis pedis pulses are 1+ on the right side and 1+ on the left side.      Heart sounds: No murmur heard.     Comments: BL LE edema 1+  Pulmonary:      Effort: Pulmonary effort is normal.      Breath sounds: Normal breath sounds.   Abdominal:      General: Bowel sounds are normal. There is no distension.      Palpations: Abdomen is soft.      Tenderness: There is no abdominal tenderness.   Musculoskeletal:         General: No swelling. Normal range of motion.      Cervical back: Normal range of motion and neck supple.   Feet:      Right foot:      Skin integrity: No ulcer, skin breakdown, erythema, warmth, callus or dry skin.      Left foot:      Skin integrity: No ulcer, skin breakdown, erythema, warmth, callus or dry skin.   Skin:     General: Skin is warm and dry.      Findings: No rash.      Comments: Left hand is swollen over the dorsum    Neurological:      General: No focal deficit present.      Mental Status: He is alert and oriented to person, place, and time.      Motor: No weakness.      Gait: Gait normal.   Psychiatric:         Mood and Affect: Mood normal.       Patient's shoes and socks removed.    Right Foot/Ankle   Right Foot Inspection  Skin Exam: skin normal and skin intact. No dry skin, no warmth, no callus, no erythema, no maceration, no abnormal color, no pre-ulcer, no ulcer and no callus.     Toe Exam: No " swelling, no tenderness, erythema and  no right toe deformity    Sensory   Monofilament testing: diminished    Vascular  The right DP pulse is 1+.     Left Foot/Ankle  Left Foot Inspection  Skin Exam: skin normal and skin intact. No dry skin, no warmth, no erythema, no maceration, normal color, no pre-ulcer, no ulcer and no callus.     Toe Exam: No swelling, no tenderness, no erythema and no left toe deformity.     Sensory   Monofilament testing: diminished    Vascular  The left DP pulse is 1+.     Assign Risk Category  No deformity present  Loss of protective sensation  Weak pulses  Risk: 2      Azalia Muñoz MD

## 2024-03-01 NOTE — ASSESSMENT & PLAN NOTE
Lab Results   Component Value Date    HGBA1C 5.8 02/29/2024     Blood sugar at home ranges 110 -140's.    Patient is no longer on Insulin therapy.    Takes Metformin 1000 mg -1/2 tablet when blood sugar goes up.    Recommended to schedule eye exam with ophthalmologist.

## 2024-03-01 NOTE — ASSESSMENT & PLAN NOTE
Blood test done in January 2024 in [place] showed platelet count of 97,000.  Currently on chemotherapy for lung CA.

## 2024-03-01 NOTE — ASSESSMENT & PLAN NOTE
Recommended to follow a low-sodium diet.    D/C HCTZ.    Start Torsemide 10 mg daily.    Recommended to keep legs elevated throughout the day.

## 2024-03-01 NOTE — ASSESSMENT & PLAN NOTE
BP at home ranges 115/70's.  Currently taking HCTZ 25 mg daily.    Patient has bilateral leg edema.    Will d/c HCTZ and start Torsemide 10 mg daily.  Follow a low sodium diet.  Check BMP in 10-14 days.    Monitor BP at home and call with BP log in 7-10 days.

## 2024-03-01 NOTE — ASSESSMENT & PLAN NOTE
Patient was diagnosed with lung adenocarcinoma in 1/2019, S/P ARMEN lobectomy, with en- bloc left lower lobe wedge resection.  Developed 3 brain metastases in November 2021, S/P craniotomy with frontal lesion excision in June 2022.  Followed by oncology Dr. Paulino at [facility] every 3 months.  Currently taking Tepmetko and Tagrisso daily.

## 2024-05-02 ENCOUNTER — RA CDI HCC (OUTPATIENT)
Dept: OTHER | Facility: HOSPITAL | Age: 69
End: 2024-05-02

## 2024-05-02 NOTE — PROGRESS NOTES
HCC coding opportunities          Chart Reviewed number of suggestions sent to Provider: 1  E11.22   Patients Insurance     Medicare Insurance: Medicare

## 2024-05-06 ENCOUNTER — APPOINTMENT (OUTPATIENT)
Dept: LAB | Facility: CLINIC | Age: 69
End: 2024-05-06
Payer: MEDICARE

## 2024-05-06 DIAGNOSIS — I10 PRIMARY HYPERTENSION: ICD-10-CM

## 2024-05-06 DIAGNOSIS — E11.40 TYPE 2 DIABETES MELLITUS WITH DIABETIC NEUROPATHY, WITHOUT LONG-TERM CURRENT USE OF INSULIN (HCC): ICD-10-CM

## 2024-05-06 DIAGNOSIS — R60.0 BILATERAL LEG EDEMA: ICD-10-CM

## 2024-05-06 LAB
ALBUMIN SERPL BCP-MCNC: 3.3 G/DL (ref 3.5–5)
ALP SERPL-CCNC: 88 U/L (ref 34–104)
ALT SERPL W P-5'-P-CCNC: 15 U/L (ref 7–52)
ANION GAP SERPL CALCULATED.3IONS-SCNC: 9 MMOL/L (ref 4–13)
AST SERPL W P-5'-P-CCNC: 18 U/L (ref 13–39)
BILIRUB SERPL-MCNC: 0.3 MG/DL (ref 0.2–1)
BUN SERPL-MCNC: 27 MG/DL (ref 5–25)
CALCIUM ALBUM COR SERPL-MCNC: 8.6 MG/DL (ref 8.3–10.1)
CALCIUM SERPL-MCNC: 8 MG/DL (ref 8.4–10.2)
CHLORIDE SERPL-SCNC: 105 MMOL/L (ref 96–108)
CO2 SERPL-SCNC: 29 MMOL/L (ref 21–32)
CREAT SERPL-MCNC: 1.82 MG/DL (ref 0.6–1.3)
CREAT UR-MCNC: 138 MG/DL
GFR SERPL CREATININE-BSD FRML MDRD: 36 ML/MIN/1.73SQ M
GLUCOSE P FAST SERPL-MCNC: 120 MG/DL (ref 65–99)
MICROALBUMIN UR-MCNC: <7 MG/L
MICROALBUMIN/CREAT 24H UR: <5 MG/G CREATININE (ref 0–30)
POTASSIUM SERPL-SCNC: 3.8 MMOL/L (ref 3.5–5.3)
PROT SERPL-MCNC: 5.8 G/DL (ref 6.4–8.4)
SODIUM SERPL-SCNC: 143 MMOL/L (ref 135–147)

## 2024-05-06 PROCEDURE — 82043 UR ALBUMIN QUANTITATIVE: CPT

## 2024-05-06 PROCEDURE — 36415 COLL VENOUS BLD VENIPUNCTURE: CPT

## 2024-05-06 PROCEDURE — 80053 COMPREHEN METABOLIC PANEL: CPT

## 2024-05-06 PROCEDURE — 82570 ASSAY OF URINE CREATININE: CPT

## 2024-05-09 ENCOUNTER — OFFICE VISIT (OUTPATIENT)
Dept: FAMILY MEDICINE CLINIC | Facility: CLINIC | Age: 69
End: 2024-05-09
Payer: MEDICARE

## 2024-05-09 VITALS
RESPIRATION RATE: 18 BRPM | DIASTOLIC BLOOD PRESSURE: 76 MMHG | WEIGHT: 160.8 LBS | HEIGHT: 64 IN | TEMPERATURE: 97.8 F | OXYGEN SATURATION: 99 % | HEART RATE: 55 BPM | SYSTOLIC BLOOD PRESSURE: 120 MMHG | BODY MASS INDEX: 27.45 KG/M2

## 2024-05-09 DIAGNOSIS — E55.9 VITAMIN D DEFICIENCY: ICD-10-CM

## 2024-05-09 DIAGNOSIS — I82.4Y2 ACUTE DEEP VEIN THROMBOSIS (DVT) OF PROXIMAL VEIN OF LEFT LOWER EXTREMITY (HCC): ICD-10-CM

## 2024-05-09 DIAGNOSIS — D69.6 THROMBOCYTOPENIA (HCC): ICD-10-CM

## 2024-05-09 DIAGNOSIS — Z00.00 MEDICARE ANNUAL WELLNESS VISIT, SUBSEQUENT: ICD-10-CM

## 2024-05-09 DIAGNOSIS — E88.09 HYPOALBUMINEMIA: ICD-10-CM

## 2024-05-09 DIAGNOSIS — R60.0 BILATERAL LEG EDEMA: ICD-10-CM

## 2024-05-09 DIAGNOSIS — I10 PRIMARY HYPERTENSION: Primary | ICD-10-CM

## 2024-05-09 DIAGNOSIS — N18.32 STAGE 3B CHRONIC KIDNEY DISEASE (HCC): ICD-10-CM

## 2024-05-09 DIAGNOSIS — C34.92 MALIGNANT NEOPLASM OF LEFT LUNG, UNSPECIFIED PART OF LUNG (HCC): ICD-10-CM

## 2024-05-09 DIAGNOSIS — E11.40 TYPE 2 DIABETES MELLITUS WITH DIABETIC NEUROPATHY, WITHOUT LONG-TERM CURRENT USE OF INSULIN (HCC): ICD-10-CM

## 2024-05-09 PROCEDURE — G0439 PPPS, SUBSEQ VISIT: HCPCS | Performed by: FAMILY MEDICINE

## 2024-05-09 PROCEDURE — 99214 OFFICE O/P EST MOD 30 MIN: CPT | Performed by: FAMILY MEDICINE

## 2024-05-09 NOTE — PROGRESS NOTES
Assessment and Plan:     Problem List Items Addressed This Visit          Cardiovascular and Mediastinum    Hypertension - Primary     BP remains stable.  Follow a low sodium diet.  Continue to monitor BP at home.           Relevant Orders    Ambulatory Referral to Nephrology    Comprehensive metabolic panel    Basic metabolic panel    Acute deep vein thrombosis (DVT) of left lower extremity (HCC)     Patient was diagnosed with DVT L LE in December 2023.  Continue Eliquis 5 mg twice daily.         Relevant Orders     VAS VENOUS DUPLEX - LOWER LIMB BILATERAL       Respiratory    Malignant neoplasm of left lung (HCC)     Follow-up with oncology Dr. Paulino at [facility] [place] every 3 months.    Continues on Tepmetko and Tagrisso daily.            Endocrine    Type 2 diabetes mellitus, without long-term current use of insulin (HCC)       Lab Results   Component Value Date    HGBA1C 5.8 02/29/2024     Recommended to stop taking Metformin.  Monitor blood sugar twice daily before breakfast and dinner and send in blood sugar log for review 2 weeks.      Schedule eye exam with ophthalmology.    Recommended to establish podiatrist for routine foot care.         Relevant Orders    Ambulatory Referral to Nephrology    Comprehensive metabolic panel    Lipid panel    Hemoglobin A1C    Basic metabolic panel       Genitourinary    Stage 3b chronic kidney disease (HCC)     Lab Results   Component Value Date    EGFR 36 05/06/2024    EGFR 42 06/01/2022    EGFR 78 06/19/2018    CREATININE 1.82 (H) 05/06/2024    CREATININE 1.61 (H) 06/01/2022    CREATININE 1.01 06/19/2018     Recommended to recheck Kidney U./S.  Encouraged to stay well-hydrated.  Avoid NSAIDs, nephrotoxins.    Recheck BMP in 2 weeks.  Schedule evaluation by nephrology.           Relevant Orders    US kidney and bladder    Ambulatory Referral to Nephrology    Basic metabolic panel       Hematopoietic and Hemostatic    Thrombocytopenia (HCC)     Blood  work done in January 2024 in [place] showed platelet count of 97,000.  Currently on chemotherapy for lung cancer.  Check CBC with dif.          Relevant Medications    apixaban (Eliquis) 5 mg       Surgery/Wound/Pain    Bilateral leg edema     BL leg edema improved since last visit in 2/2024 after starting on Demadex 10 mg daily.  Recommended not to exceed 10 mg daily to avoid worsening kidney function.  Check BMP in 2 weeks.           Relevant Orders    Ambulatory Referral to Nephrology     VAS VENOUS DUPLEX - LOWER LIMB BILATERAL       Other    Vitamin D deficiency     Continue Vit D 5000 IU daily.           Hypoalbuminemia     Serum Albumin 3.3.    Recommended to supplement diet with protein boost or Ensure daily.            Other Visit Diagnoses       Medicare annual wellness visit, subsequent                 Preventive health issues were discussed with patient, and age appropriate screening tests were ordered as noted in patient's After Visit Summary.  Personalized health advice and appropriate referrals for health education or preventive services given if needed, as noted in patient's After Visit Summary.    Schedule follow-up visit in 4 months.  Check labs prior to next visit.     History of Present Illness:     Patient presents for a Medicare Wellness Visit    HPI     Patient presents for follow-up visit, Medicare AW.    He moved from [place] to [facility], was seen for an initial evaluation in our office on 2/29/2024.    PMHx: HTN, Type 2 DM, Vit D deficiency, lung adenocarcinoma diagnosed in 1/2019, S/P ARMEN lobectomy, with en- bloc left lower lobe wedge resection, developed 3 brain metastases in November 2021, S/P craniotomy with frontal lesion excision in June 2022, DVT L LE in 12/202, BPH.     Reviewed current medications, blood test results from 5/6/2024.    Fasting bood sugar 120, creatinine 1.82, GFR 36, potassium 3.8, AST, ALT- WNL.  Total protein 5.8, albumin 3.3.    HTN - BP at home ranges 125  -130/57- 64.    Denies chest pain, shortness of breath, dizziness.      Type 2 DM - patient states that he takes Metformin 1000 mg- 1/4 tab. approximately 2 times per week when blood sugar above 140.    Fasting blood sugar this morning was 135.  He did not schedule visit with ophthalmologist yet.  Previously followed by podiatrist in [place].    Reports improvement in BL leg edema, left hand swelling after started taking Torsemide 10 mg daily.    Lung CA - followed by oncology Dr. Paulion at [facility] [place] every 3 months.    Currently taking Tepmetko and Tagrisso daily.   Patient was told by oncology that hand swelling and leg edema could be related to chemotherapy.    C/o decreased appetite, lost 7 lb since last visit in February.  No nausea, vomiting, diarrhea.    Patient was diagnosed with DVT L LE in December 2023.    He states that he takes Eliquis 5 mg twice daily.    Denies tobacco, alcohol use.    Patient Care Team:  Azalia Muñoz MD as PCP - General (Family Medicine)     Review of Systems:     Review of Systems   Constitutional:  Positive for appetite change (decreased) and fatigue. Negative for activity change, chills and fever.   HENT:  Negative for congestion, hearing loss, sore throat and trouble swallowing.    Eyes:  Negative for pain, discharge, redness, itching and visual disturbance.   Respiratory:  Negative for cough, chest tightness, shortness of breath and wheezing.    Cardiovascular:  Positive for leg swelling (improved). Negative for chest pain.   Gastrointestinal:  Negative for abdominal pain, blood in stool, constipation, diarrhea, nausea and vomiting.   Genitourinary:  Negative for difficulty urinating, dysuria and hematuria.   Musculoskeletal:  Positive for arthralgias (hand joints). Negative for back pain, gait problem and joint swelling.   Skin:  Negative for rash.   Neurological:  Positive for numbness. Negative for dizziness, syncope and headaches.  Light-headedness: in feet.  Hematological: Negative.    Psychiatric/Behavioral:  Negative for dysphoric mood and sleep disturbance. The patient is not nervous/anxious.         Problem List:     Patient Active Problem List   Diagnosis    Hypertension    Type 2 diabetes mellitus, without long-term current use of insulin (HCC)    Malignant neoplasm of left lung (HCC)    Bilateral leg edema    Vitamin D deficiency    Acute deep vein thrombosis (DVT) of left lower extremity (HCC)    Hypoalbuminemia    Thrombocytopenia (HCC)    Stage 3b chronic kidney disease (HCC)      Past Medical and Surgical History:     Past Medical History:   Diagnosis Date    Cancer (HCC)     Diabetes mellitus (HCC)     High blood pressure     Hypertension     Lung cancer (HCC)     Lung disorder     Urinary problem      Past Surgical History:   Procedure Laterality Date    BRAIN SURGERY      CHOLECYSTECTOMY      LUNG CANCER SURGERY  05/2019    partial lung removed     TONSILLECTOMY        Family History:     Family History   Family history unknown: Yes      Social History:     Social History     Socioeconomic History    Marital status: /Civil Union     Spouse name: None    Number of children: None    Years of education: None    Highest education level: None   Occupational History    None   Tobacco Use    Smoking status: Never     Passive exposure: Never    Smokeless tobacco: Never   Vaping Use    Vaping status: Never Used   Substance and Sexual Activity    Alcohol use: No    Drug use: No    Sexual activity: None   Other Topics Concern    None   Social History Narrative    None     Social Determinants of Health     Financial Resource Strain: Not on file   Food Insecurity: No Food Insecurity (5/9/2024)    Hunger Vital Sign     Worried About Running Out of Food in the Last Year: Never true     Ran Out of Food in the Last Year: Never true   Transportation Needs: No Transportation Needs (5/9/2024)    PRAPARE - Transportation     Lack of  Transportation (Medical): No     Lack of Transportation (Non-Medical): No   Physical Activity: Not on file   Stress: Not on file   Social Connections: Not on file   Intimate Partner Violence: Not on file   Housing Stability: Unknown (5/9/2024)    Housing Stability Vital Sign     Unable to Pay for Housing in the Last Year: No     Number of Places Lived in the Last Year: Not on file     Unstable Housing in the Last Year: No      Medications and Allergies:     Current Outpatient Medications   Medication Sig Dispense Refill    apixaban (Eliquis) 5 mg Take 5 mg by mouth 2 (two) times a day      Osimertinib Mesylate 80 MG TABS Take 80 mg by mouth in the morning      Tepotinib HCl (Tepmetko) 225 MG TABS Take 225 mg by mouth in the morning      torsemide (DEMADEX) 10 mg tablet Take 1 tablet (10 mg total) by mouth daily 30 tablet 5    hydrALAZINE (APRESOLINE) 50 mg tablet Take 50 mg by mouth 3 (three) times a day (Patient not taking: Reported on 2/29/2024)      metFORMIN (GLUCOPHAGE) 1000 MG tablet Take 1 tablet (1,000 mg total) by mouth 2 (two) times a day with meals for 30 days (Patient taking differently: Take 1,000 mg by mouth 1/2 tab. daily PRN) 60 tablet 1     No current facility-administered medications for this visit.     Allergies   Allergen Reactions    Amoxicillin Rash     Other reaction(s): Pruritus      Immunizations:     Immunization History   Administered Date(s) Administered    COVID-19 MODERNA VACC 0.5 ML IM 06/19/2021, 07/17/2021, 12/27/2021      Health Maintenance:         Topic Date Due    Hepatitis C Screening  Never done    Colorectal Cancer Screening  Never done         Topic Date Due    Pneumococcal Vaccine: 65+ Years (1 of 2 - PCV) Never done    COVID-19 Vaccine (4 - 2023-24 season) 09/01/2023      Medicare Screening Tests and Risk Assessments:     Andrea is here for his Subsequent Wellness visit.     Health Risk Assessment:   Patient rates overall health as good. Patient feels that their physical  health rating is same. Patient is satisfied with their life. Eyesight was rated as same. Hearing was rated as same. Patient feels that their emotional and mental health rating is same. Patients states they are never, rarely angry. Patient states they are never, rarely unusually tired/fatigued. Pain experienced in the last 7 days has been none. Patient states that he has experienced no weight loss or gain in last 6 months.     Fall Risk Screening:   In the past year, patient has experienced: no history of falling in past year      Home Safety:  Patient does not have trouble with stairs inside or outside of their home. Patient has working smoke alarms and has working carbon monoxide detector. Home safety hazards include: none.     Nutrition:   Current diet is Regular.     Medications:   Patient is not currently taking any over-the-counter supplements. Patient is able to manage medications.     Activities of Daily Living (ADLs)/Instrumental Activities of Daily Living (IADLs):   Walk and transfer into and out of bed and chair?: Yes  Dress and groom yourself?: Yes    Bathe or shower yourself?: Yes    Feed yourself? Yes  Do your laundry/housekeeping?: Yes  Manage your money, pay your bills and track your expenses?: Yes  Make your own meals?: Yes    Do your own shopping?: Yes    Previous Hospitalizations:   Any hospitalizations or ED visits within the last 12 months?: No      Advance Care Planning:   Living will: No    Durable POA for healthcare: No    Advanced directive: No    Advanced directive counseling given: Yes      Cognitive Screening:   Provider or family/friend/caregiver concerned regarding cognition?: No    PREVENTIVE SCREENINGS      Cardiovascular Screening:    General: Screening Current      Diabetes Screening:     General: Screening Not Indicated and History Diabetes      Prostate Cancer Screening:    General: Risks and Benefits Discussed      Osteoporosis Screening:    General: Risks and Benefits  "Discussed      Abdominal Aortic Aneurysm (AAA) Screening:    Risk factors include: age between 65-74 yo        Lung Cancer Screening:     General: Screening Not Indicated and History Lung Cancer      Hepatitis C Screening:    General: Screening Not Indicated    Screening, Brief Intervention, and Referral to Treatment (SBIRT)    Screening  Typical number of drinks in a day: 0  Typical number of drinks in a week: 0  Interpretation: Low risk drinking behavior.    Single Item Drug Screening:  How often have you used an illegal drug (including marijuana) or a prescription medication for non-medical reasons in the past year? never    Single Item Drug Screen Score: 0  Interpretation: Negative screen for possible drug use disorder    Other Counseling Topics:   Car/seat belt/driving safety, skin self-exam and calcium and vitamin D intake and regular weightbearing exercise.     No results found.     Physical Exam:     /76 (BP Location: Left arm, Patient Position: Sitting, Cuff Size: Standard)   Pulse 55   Temp 97.8 °F (36.6 °C) (Tympanic)   Resp 18   Ht 5' 4\" (1.626 m)   Wt 72.9 kg (160 lb 12.8 oz)   SpO2 99%   BMI 27.60 kg/m²     Physical Exam  Vitals and nursing note reviewed.   Constitutional:       Appearance: Normal appearance.   HENT:      Head: Normocephalic and atraumatic.   Eyes:      Conjunctiva/sclera: Conjunctivae normal.      Pupils: Pupils are equal, round, and reactive to light.   Neck:      Vascular: No carotid bruit.   Cardiovascular:      Rate and Rhythm: Regular rhythm. Bradycardia present.      Heart sounds: No murmur heard.     Comments: BL LE edema improved.  Left leg is more edematous than right.    Pulmonary:      Effort: Pulmonary effort is normal.      Breath sounds: Normal breath sounds.   Abdominal:      General: Bowel sounds are normal. There is no distension.      Palpations: Abdomen is soft.      Tenderness: There is no abdominal tenderness.   Musculoskeletal:      Cervical back: " Normal range of motion and neck supple.      Right lower leg: Edema present.      Comments: Left hand is mildly swollen.  Arthritic changes in hand joints   Skin:     General: Skin is warm and dry.      Findings: No rash.   Neurological:      General: No focal deficit present.      Mental Status: He is alert and oriented to person, place, and time.      Gait: Gait normal.   Psychiatric:         Mood and Affect: Mood normal.          Azalia Muñoz MD

## 2024-05-09 NOTE — PATIENT INSTRUCTIONS
Medicare Preventive Visit Patient Instructions  Thank you for completing your Welcome to Medicare Visit or Medicare Annual Wellness Visit today. Your next wellness visit will be due in one year (5/10/2025).  The screening/preventive services that you may require over the next 5-10 years are detailed below. Some tests may not apply to you based off risk factors and/or age. Screening tests ordered at today's visit but not completed yet may show as past due. Also, please note that scanned in results may not display below.  Preventive Screenings:  Service Recommendations Previous Testing/Comments   Colorectal Cancer Screening  Colonoscopy    Fecal Occult Blood Test (FOBT)/Fecal Immunochemical Test (FIT)  Fecal DNA/Cologuard Test  Flexible Sigmoidoscopy Age: 45-75 years old   Colonoscopy: every 10 years (May be performed more frequently if at higher risk)  OR  FOBT/FIT: every 1 year  OR  Cologuard: every 3 years  OR  Sigmoidoscopy: every 5 years  Screening may be recommended earlier than age 45 if at higher risk for colorectal cancer. Also, an individualized decision between you and your healthcare provider will decide whether screening between the ages of 76-85 would be appropriate. Colonoscopy: Not on file  FOBT/FIT: Not on file  Cologuard: Not on file  Sigmoidoscopy: Not on file          Prostate Cancer Screening Individualized decision between patient and health care provider in men between ages of 55-69   Medicare will cover every 12 months beginning on the day after your 50th birthday PSA: No results in last 5 years           Hepatitis C Screening Once for adults born between 1945 and 1965  More frequently in patients at high risk for Hepatitis C Hep C Antibody: Not on file        Diabetes Screening 1-2 times per year if you're at risk for diabetes or have pre-diabetes Fasting glucose: 120 mg/dL (5/6/2024)  A1C: 5.8 (2/29/2024)      Cholesterol Screening Once every 5 years if you don't have a lipid disorder. May  order more often based on risk factors. Lipid panel: Not on file         Other Preventive Screenings Covered by Medicare:  Abdominal Aortic Aneurysm (AAA) Screening: covered once if your at risk. You're considered to be at risk if you have a family history of AAA or a male between the age of 65-75 who smoking at least 100 cigarettes in your lifetime.  Lung Cancer Screening: covers low dose CT scan once per year if you meet all of the following conditions: (1) Age 55-77; (2) No signs or symptoms of lung cancer; (3) Current smoker or have quit smoking within the last 15 years; (4) You have a tobacco smoking history of at least 20 pack years (packs per day x number of years you smoked); (5) You get a written order from a healthcare provider.  Glaucoma Screening: covered annually if you're considered high risk: (1) You have diabetes OR (2) Family history of glaucoma OR (3)  aged 50 and older OR (4)  American aged 65 and older  Osteoporosis Screening: covered every 2 years if you meet one of the following conditions: (1) Have a vertebral abnormality; (2) On glucocorticoid therapy for more than 3 months; (3) Have primary hyperparathyroidism; (4) On osteoporosis medications and need to assess response to drug therapy.  HIV Screening: covered annually if you're between the age of 15-65. Also covered annually if you are younger than 15 and older than 65 with risk factors for HIV infection. For pregnant patients, it is covered up to 3 times per pregnancy.    Immunizations:  Immunization Recommendations   Influenza Vaccine Annual influenza vaccination during flu season is recommended for all persons aged >= 6 months who do not have contraindications   Pneumococcal Vaccine   * Pneumococcal conjugate vaccine = PCV13 (Prevnar 13), PCV15 (Vaxneuvance), PCV20 (Prevnar 20)  * Pneumococcal polysaccharide vaccine = PPSV23 (Pneumovax) Adults 19-63 yo with certain risk factors or if 65+ yo  If never received any  pneumonia vaccine: recommend Prevnar 20 (PCV20)  Give PCV20 if previously received 1 dose of PCV13 or PPSV23   Hepatitis B Vaccine 3 dose series if at intermediate or high risk (ex: diabetes, end stage renal disease, liver disease)   Respiratory syncytial virus (RSV) Vaccine - COVERED BY MEDICARE PART D  * RSVPreF3 (Arexvy) CDC recommends that adults 60 years of age and older may receive a single dose of RSV vaccine using shared clinical decision-making (SCDM)   Tetanus (Td) Vaccine - COST NOT COVERED BY MEDICARE PART B Following completion of primary series, a booster dose should be given every 10 years to maintain immunity against tetanus. Td may also be given as tetanus wound prophylaxis.   Tdap Vaccine - COST NOT COVERED BY MEDICARE PART B Recommended at least once for all adults. For pregnant patients, recommended with each pregnancy.   Shingles Vaccine (Shingrix) - COST NOT COVERED BY MEDICARE PART B  2 shot series recommended in those 19 years and older who have or will have weakened immune systems or those 50 years and older     Health Maintenance Due:      Topic Date Due   • Hepatitis C Screening  Never done   • Colorectal Cancer Screening  Never done     Immunizations Due:      Topic Date Due   • Pneumococcal Vaccine: 65+ Years (1 of 2 - PCV) Never done   • COVID-19 Vaccine (4 - 2023-24 season) 09/01/2023     Advance Directives   What are advance directives?  Advance directives are legal documents that state your wishes and plans for medical care. These plans are made ahead of time in case you lose your ability to make decisions for yourself. Advance directives can apply to any medical decision, such as the treatments you want, and if you want to donate organs.   What are the types of advance directives?  There are many types of advance directives, and each state has rules about how to use them. You may choose a combination of any of the following:  Living will:  This is a written record of the treatment  you want. You can also choose which treatments you do not want, which to limit, and which to stop at a certain time. This includes surgery, medicine, IV fluid, and tube feedings.   Durable power of  for healthcare (DPAHC):  This is a written record that states who you want to make healthcare choices for you when you are unable to make them for yourself. This person, called a proxy, is usually a family member or a friend. You may choose more than 1 proxy.  Do not resuscitate (DNR) order:  A DNR order is used in case your heart stops beating or you stop breathing. It is a request not to have certain forms of treatment, such as CPR. A DNR order may be included in other types of advance directives.  Medical directive:  This covers the care that you want if you are in a coma, near death, or unable to make decisions for yourself. You can list the treatments you want for each condition. Treatment may include pain medicine, surgery, blood transfusions, dialysis, IV or tube feedings, and a ventilator (breathing machine).  Values history:  This document has questions about your views, beliefs, and how you feel and think about life. This information can help others choose the care that you would choose.  Why are advance directives important?  An advance directive helps you control your care. Although spoken wishes may be used, it is better to have your wishes written down. Spoken wishes can be misunderstood, or not followed. Treatments may be given even if you do not want them. An advance directive may make it easier for your family to make difficult choices about your care.   Weight Management   Why it is important to manage your weight:  Being overweight increases your risk of health conditions such as heart disease, high blood pressure, type 2 diabetes, and certain types of cancer. It can also increase your risk for osteoarthritis, sleep apnea, and other respiratory problems. Aim for a slow, steady weight loss. Even  a small amount of weight loss can lower your risk of health problems.  How to lose weight safely:  A safe and healthy way to lose weight is to eat fewer calories and get regular exercise. You can lose up about 1 pound a week by decreasing the number of calories you eat by 500 calories each day.   Healthy meal plan for weight management:  A healthy meal plan includes a variety of foods, contains fewer calories, and helps you stay healthy. A healthy meal plan includes the following:  Eat whole-grain foods more often.  A healthy meal plan should contain fiber. Fiber is the part of grains, fruits, and vegetables that is not broken down by your body. Whole-grain foods are healthy and provide extra fiber in your diet. Some examples of whole-grain foods are whole-wheat breads and pastas, oatmeal, brown rice, and bulgur.  Eat a variety of vegetables every day.  Include dark, leafy greens such as spinach, kale, steven greens, and mustard greens. Eat yellow and orange vegetables such as carrots, sweet potatoes, and winter squash.   Eat a variety of fruits every day.  Choose fresh or canned fruit (canned in its own juice or light syrup) instead of juice. Fruit juice has very little or no fiber.  Eat low-fat dairy foods.  Drink fat-free (skim) milk or 1% milk. Eat fat-free yogurt and low-fat cottage cheese. Try low-fat cheeses such as mozzarella and other reduced-fat cheeses.  Choose meat and other protein foods that are low in fat.  Choose beans or other legumes such as split peas or lentils. Choose fish, skinless poultry (chicken or turkey), or lean cuts of red meat (beef or pork). Before you cook meat or poultry, cut off any visible fat.   Use less fat and oil.  Try baking foods instead of frying them. Add less fat, such as margarine, sour cream, regular salad dressing and mayonnaise to foods. Eat fewer high-fat foods. Some examples of high-fat foods include french fries, doughnuts, ice cream, and cakes.  Eat fewer sweets.   Limit foods and drinks that are high in sugar. This includes candy, cookies, regular soda, and sweetened drinks.  Exercise:  Exercise at least 30 minutes per day on most days of the week. Some examples of exercise include walking, biking, dancing, and swimming. You can also fit in more physical activity by taking the stairs instead of the elevator or parking farther away from stores. Ask your healthcare provider about the best exercise plan for you.      © Copyright [employer] 2018 Information is for End User's use only and may not be sold, redistributed or otherwise used for commercial purposes. All illustrations and images included in CareNotes® are the copyrighted property of A.D.A[employer]M., Inc. or [employer]

## 2024-05-10 ENCOUNTER — TELEPHONE (OUTPATIENT)
Dept: NEPHROLOGY | Facility: CLINIC | Age: 69
End: 2024-05-10

## 2024-05-10 PROBLEM — N18.32 STAGE 3B CHRONIC KIDNEY DISEASE (HCC): Status: ACTIVE | Noted: 2024-05-10

## 2024-05-10 NOTE — ASSESSMENT & PLAN NOTE
Follow-up with oncology Dr. Paulino at [facility] [place] every 3 months.    Continues on Tepmetko and Tagrisso daily.

## 2024-05-10 NOTE — ASSESSMENT & PLAN NOTE
Lab Results   Component Value Date    EGFR 36 05/06/2024    EGFR 42 06/01/2022    EGFR 78 06/19/2018    CREATININE 1.82 (H) 05/06/2024    CREATININE 1.61 (H) 06/01/2022    CREATININE 1.01 06/19/2018     Recommended to recheck Kidney U./S.  Encouraged to stay well-hydrated.  Avoid NSAIDs, nephrotoxins.    Recheck BMP in 2 weeks.  Schedule evaluation by nephrology.

## 2024-05-10 NOTE — ASSESSMENT & PLAN NOTE
BL leg edema improved since last visit in 2/2024 after starting on Demadex 10 mg daily.  Recommended not to exceed 10 mg daily to avoid worsening kidney function.  Check BMP in 2 weeks.

## 2024-05-10 NOTE — ASSESSMENT & PLAN NOTE
Lab Results   Component Value Date    HGBA1C 5.8 02/29/2024     Recommended to stop taking Metformin.  Monitor blood sugar twice daily before breakfast and dinner and send in blood sugar log for review 2 weeks.      Schedule eye exam with ophthalmology.    Recommended to establish podiatrist for routine foot care.

## 2024-05-10 NOTE — ASSESSMENT & PLAN NOTE
Blood work done in January 2024 in [place] showed platelet count of 97,000.  Currently on chemotherapy for lung cancer.  Check CBC with dif.

## 2024-05-10 NOTE — TELEPHONE ENCOUNTER
I called and left message on patients answering machine for patient to give the office a call back to schedule a Nephrology Consult ref by Azalia Muñoz for  Primary hypertension.  Type 2 diabetes mellitus with diabetic neuropathy, without long-term current use of insulin (HCC)   Bilateral leg edema,Stage 3b chronic kidney disease (HCC)

## 2024-05-15 ENCOUNTER — NEW PATIENT (OUTPATIENT)
Dept: URBAN - METROPOLITAN AREA CLINIC 6 | Facility: CLINIC | Age: 69
End: 2024-05-15

## 2024-05-15 DIAGNOSIS — R73.09: ICD-10-CM

## 2024-05-15 DIAGNOSIS — H25.13: ICD-10-CM

## 2024-05-15 LAB
LEFT EYE DIABETIC RETINOPATHY: NORMAL
RIGHT EYE DIABETIC RETINOPATHY: NORMAL

## 2024-05-15 PROCEDURE — 92004 COMPRE OPH EXAM NEW PT 1/>: CPT

## 2024-05-15 ASSESSMENT — TONOMETRY
OD_IOP_MMHG: 15
OS_IOP_MMHG: 15

## 2024-05-15 ASSESSMENT — VISUAL ACUITY
OD_CC: 20/40
OU_CC: J2
OS_CC: 20/40
OS_PH: 20/30-1

## 2024-06-14 ENCOUNTER — TELEPHONE (OUTPATIENT)
Dept: NEPHROLOGY | Facility: CLINIC | Age: 69
End: 2024-06-14

## 2024-06-14 NOTE — TELEPHONE ENCOUNTER
I called and spoke to the patient and schedule him for a Nephrology Consult appointment with Sheri (ROGER) on 8/12/2024. The patient understood and was okay with the day and time of his next appointment.

## 2024-07-08 ENCOUNTER — HOSPITAL ENCOUNTER (OUTPATIENT)
Dept: ULTRASOUND IMAGING | Facility: HOSPITAL | Age: 69
Discharge: HOME | End: 2024-07-08
Payer: MEDICARE

## 2024-07-08 ENCOUNTER — APPOINTMENT (OUTPATIENT)
Dept: LAB | Facility: HOSPITAL | Age: 69
End: 2024-07-08
Payer: MEDICARE

## 2024-07-08 DIAGNOSIS — I10 PRIMARY HYPERTENSION: ICD-10-CM

## 2024-07-08 DIAGNOSIS — E11.40 TYPE 2 DIABETES MELLITUS WITH DIABETIC NEUROPATHY, WITHOUT LONG-TERM CURRENT USE OF INSULIN (HCC): ICD-10-CM

## 2024-07-08 DIAGNOSIS — N18.32 STAGE 3B CHRONIC KIDNEY DISEASE (HCC): ICD-10-CM

## 2024-07-08 PROCEDURE — 76775 US EXAM ABDO BACK WALL LIM: CPT

## 2024-07-15 ENCOUNTER — APPOINTMENT (OUTPATIENT)
Dept: LAB | Facility: CLINIC | Age: 69
End: 2024-07-15
Payer: MEDICARE

## 2024-07-29 ENCOUNTER — TELEPHONE (OUTPATIENT)
Dept: NEPHROLOGY | Facility: CLINIC | Age: 69
End: 2024-07-29

## 2024-07-29 DIAGNOSIS — N18.30 STAGE 3 CHRONIC KIDNEY DISEASE, UNSPECIFIED WHETHER STAGE 3A OR 3B CKD (HCC): Primary | ICD-10-CM

## 2024-08-05 ENCOUNTER — TELEPHONE (OUTPATIENT)
Dept: NEPHROLOGY | Facility: CLINIC | Age: 69
End: 2024-08-05

## 2024-08-05 ENCOUNTER — TELEPHONE (OUTPATIENT)
Age: 69
End: 2024-08-05

## 2024-08-05 DIAGNOSIS — N18.32 STAGE 3B CHRONIC KIDNEY DISEASE (HCC): Primary | ICD-10-CM

## 2024-08-05 NOTE — TELEPHONE ENCOUNTER
Patient asking what labs were ordered and when appt is scheduled. Made aware of labs ordered by Sheri and appt 8/9 11 am. Patient verbalized understanding.

## 2024-08-06 NOTE — TELEPHONE ENCOUNTER
Phone call from patient's daughter to clarify Appt/labs due to patient's language barrier. Appt confirmed with patient's daughter, in addition to informing her that patient needs fasting labs prior to appt. Patient's daughter agreeable.

## 2024-08-08 ENCOUNTER — APPOINTMENT (OUTPATIENT)
Dept: LAB | Facility: CLINIC | Age: 69
End: 2024-08-08
Payer: MEDICARE

## 2024-08-08 DIAGNOSIS — E11.40 TYPE 2 DIABETES MELLITUS WITH DIABETIC NEUROPATHY, WITHOUT LONG-TERM CURRENT USE OF INSULIN (HCC): ICD-10-CM

## 2024-08-08 DIAGNOSIS — N18.32 STAGE 3B CHRONIC KIDNEY DISEASE (HCC): ICD-10-CM

## 2024-08-08 DIAGNOSIS — N18.30 STAGE 3 CHRONIC KIDNEY DISEASE, UNSPECIFIED WHETHER STAGE 3A OR 3B CKD (HCC): ICD-10-CM

## 2024-08-08 DIAGNOSIS — I10 PRIMARY HYPERTENSION: ICD-10-CM

## 2024-08-08 LAB
25(OH)D3 SERPL-MCNC: 32.8 NG/ML (ref 30–100)
ALBUMIN SERPL BCG-MCNC: 2.9 G/DL (ref 3.5–5)
ALP SERPL-CCNC: 89 U/L (ref 34–104)
ALT SERPL W P-5'-P-CCNC: 16 U/L (ref 7–52)
ANION GAP SERPL CALCULATED.3IONS-SCNC: 6 MMOL/L (ref 4–13)
AST SERPL W P-5'-P-CCNC: 17 U/L (ref 13–39)
BACTERIA UR QL AUTO: ABNORMAL /HPF
BASOPHILS # BLD AUTO: 0.02 THOUSANDS/ÂΜL (ref 0–0.1)
BASOPHILS NFR BLD AUTO: 0 % (ref 0–1)
BILIRUB SERPL-MCNC: 0.47 MG/DL (ref 0.2–1)
BILIRUB UR QL STRIP: NEGATIVE
BUN SERPL-MCNC: 19 MG/DL (ref 5–25)
CALCIUM ALBUM COR SERPL-MCNC: 8.8 MG/DL (ref 8.3–10.1)
CALCIUM SERPL-MCNC: 7.9 MG/DL (ref 8.4–10.2)
CHLORIDE SERPL-SCNC: 109 MMOL/L (ref 96–108)
CHOLEST SERPL-MCNC: 110 MG/DL
CLARITY UR: CLEAR
CO2 SERPL-SCNC: 27 MMOL/L (ref 21–32)
COLOR UR: ABNORMAL
CREAT SERPL-MCNC: 1.83 MG/DL (ref 0.6–1.3)
CREAT UR-MCNC: 176.3 MG/DL
CREAT UR-MCNC: 176.3 MG/DL
EOSINOPHIL # BLD AUTO: 0.13 THOUSAND/ÂΜL (ref 0–0.61)
EOSINOPHIL NFR BLD AUTO: 2 % (ref 0–6)
ERYTHROCYTE [DISTWIDTH] IN BLOOD BY AUTOMATED COUNT: 14.8 % (ref 11.6–15.1)
EST. AVERAGE GLUCOSE BLD GHB EST-MCNC: 128 MG/DL
GFR SERPL CREATININE-BSD FRML MDRD: 36 ML/MIN/1.73SQ M
GLUCOSE P FAST SERPL-MCNC: 116 MG/DL (ref 65–99)
GLUCOSE UR STRIP-MCNC: NEGATIVE MG/DL
HBA1C MFR BLD: 6.1 %
HCT VFR BLD AUTO: 41.1 % (ref 36.5–49.3)
HDLC SERPL-MCNC: 36 MG/DL
HGB BLD-MCNC: 13.2 G/DL (ref 12–17)
HGB UR QL STRIP.AUTO: NEGATIVE
IMM GRANULOCYTES # BLD AUTO: 0.02 THOUSAND/UL (ref 0–0.2)
IMM GRANULOCYTES NFR BLD AUTO: 0 % (ref 0–2)
KETONES UR STRIP-MCNC: NEGATIVE MG/DL
LDLC SERPL CALC-MCNC: 63 MG/DL (ref 0–100)
LEUKOCYTE ESTERASE UR QL STRIP: NEGATIVE
LYMPHOCYTES # BLD AUTO: 0.82 THOUSANDS/ÂΜL (ref 0.6–4.47)
LYMPHOCYTES NFR BLD AUTO: 15 % (ref 14–44)
MAGNESIUM SERPL-MCNC: 2.1 MG/DL (ref 1.9–2.7)
MCH RBC QN AUTO: 29.6 PG (ref 26.8–34.3)
MCHC RBC AUTO-ENTMCNC: 32.1 G/DL (ref 31.4–37.4)
MCV RBC AUTO: 92 FL (ref 82–98)
MICROALBUMIN UR-MCNC: <7 MG/L
MONOCYTES # BLD AUTO: 0.43 THOUSAND/ÂΜL (ref 0.17–1.22)
MONOCYTES NFR BLD AUTO: 8 % (ref 4–12)
NEUTROPHILS # BLD AUTO: 4 THOUSANDS/ÂΜL (ref 1.85–7.62)
NEUTS SEG NFR BLD AUTO: 75 % (ref 43–75)
NITRITE UR QL STRIP: NEGATIVE
NON-SQ EPI CELLS URNS QL MICRO: ABNORMAL /HPF
NONHDLC SERPL-MCNC: 74 MG/DL
NRBC BLD AUTO-RTO: 0 /100 WBCS
PH UR STRIP.AUTO: 6 [PH]
PHOSPHATE SERPL-MCNC: 3 MG/DL (ref 2.3–4.1)
PLATELET # BLD AUTO: 105 THOUSANDS/UL (ref 149–390)
PMV BLD AUTO: 11.8 FL (ref 8.9–12.7)
POTASSIUM SERPL-SCNC: 4.4 MMOL/L (ref 3.5–5.3)
PROT SERPL-MCNC: 5.4 G/DL (ref 6.4–8.4)
PROT UR STRIP-MCNC: ABNORMAL MG/DL
PROT UR-MCNC: 10 MG/DL
PROT/CREAT UR: 0.06 MG/G{CREAT} (ref 0–0.1)
PTH-INTACT SERPL-MCNC: 39.2 PG/ML (ref 12–88)
RBC # BLD AUTO: 4.46 MILLION/UL (ref 3.88–5.62)
RBC #/AREA URNS AUTO: ABNORMAL /HPF
SODIUM SERPL-SCNC: 142 MMOL/L (ref 135–147)
SP GR UR STRIP.AUTO: 1.02 (ref 1–1.03)
TRIGL SERPL-MCNC: 54 MG/DL
URATE SERPL-MCNC: 8.6 MG/DL (ref 3.5–8.5)
UROBILINOGEN UR STRIP-ACNC: <2 MG/DL
WBC # BLD AUTO: 5.42 THOUSAND/UL (ref 4.31–10.16)
WBC #/AREA URNS AUTO: ABNORMAL /HPF

## 2024-08-08 PROCEDURE — 83970 ASSAY OF PARATHORMONE: CPT

## 2024-08-08 PROCEDURE — 80061 LIPID PANEL: CPT

## 2024-08-08 PROCEDURE — 84550 ASSAY OF BLOOD/URIC ACID: CPT

## 2024-08-08 PROCEDURE — 84156 ASSAY OF PROTEIN URINE: CPT

## 2024-08-08 PROCEDURE — 36415 COLL VENOUS BLD VENIPUNCTURE: CPT

## 2024-08-08 PROCEDURE — 85025 COMPLETE CBC W/AUTO DIFF WBC: CPT

## 2024-08-08 PROCEDURE — 83036 HEMOGLOBIN GLYCOSYLATED A1C: CPT

## 2024-08-08 PROCEDURE — 82570 ASSAY OF URINE CREATININE: CPT

## 2024-08-08 PROCEDURE — 80053 COMPREHEN METABOLIC PANEL: CPT

## 2024-08-08 PROCEDURE — 84100 ASSAY OF PHOSPHORUS: CPT

## 2024-08-08 PROCEDURE — 81001 URINALYSIS AUTO W/SCOPE: CPT

## 2024-08-08 PROCEDURE — 82043 UR ALBUMIN QUANTITATIVE: CPT

## 2024-08-08 PROCEDURE — 83735 ASSAY OF MAGNESIUM: CPT

## 2024-08-08 PROCEDURE — 82306 VITAMIN D 25 HYDROXY: CPT

## 2024-08-09 ENCOUNTER — CONSULT (OUTPATIENT)
Dept: NEPHROLOGY | Facility: CLINIC | Age: 69
End: 2024-08-09
Payer: MEDICARE

## 2024-08-09 VITALS
SYSTOLIC BLOOD PRESSURE: 134 MMHG | OXYGEN SATURATION: 98 % | WEIGHT: 165 LBS | HEIGHT: 64 IN | BODY MASS INDEX: 28.17 KG/M2 | DIASTOLIC BLOOD PRESSURE: 64 MMHG | HEART RATE: 57 BPM | TEMPERATURE: 97.1 F

## 2024-08-09 DIAGNOSIS — I10 PRIMARY HYPERTENSION: ICD-10-CM

## 2024-08-09 DIAGNOSIS — N18.32 STAGE 3B CHRONIC KIDNEY DISEASE (HCC): Primary | ICD-10-CM

## 2024-08-09 DIAGNOSIS — R60.0 BILATERAL LEG EDEMA: ICD-10-CM

## 2024-08-09 DIAGNOSIS — E11.40 TYPE 2 DIABETES MELLITUS WITH DIABETIC NEUROPATHY, WITHOUT LONG-TERM CURRENT USE OF INSULIN (HCC): ICD-10-CM

## 2024-08-09 PROCEDURE — G2211 COMPLEX E/M VISIT ADD ON: HCPCS | Performed by: INTERNAL MEDICINE

## 2024-08-09 PROCEDURE — 99205 OFFICE O/P NEW HI 60 MIN: CPT | Performed by: INTERNAL MEDICINE

## 2024-08-09 RX ORDER — FUROSEMIDE 20 MG
20 TABLET ORAL DAILY
COMMUNITY
End: 2024-08-09

## 2024-08-09 RX ORDER — TORSEMIDE 20 MG/1
20 TABLET ORAL DAILY
Qty: 90 TABLET | Refills: 1 | Status: SHIPPED | OUTPATIENT
Start: 2024-08-09

## 2024-08-09 RX ORDER — AZITHROMYCIN 500 MG/1
500 TABLET, FILM COATED ORAL 2 TIMES DAILY
COMMUNITY

## 2024-08-09 NOTE — ASSESSMENT & PLAN NOTE
Lab Results   Component Value Date    EGFR 36 08/08/2024    EGFR 35 07/15/2024    EGFR 36 05/06/2024    CREATININE 1.83 (H) 08/08/2024    CREATININE 1.86 (H) 07/15/2024    CREATININE 1.82 (H) 05/06/2024     Patient appears to have a new baseline creatinine between 1.8-1.9 mg/dL.  Etiology at this time is unclear.  Patient's urine studies show no significant blood or protein.  There were no other specific clues available from other blood work as well.  Patient had a recent kidney ultrasound which revealed no significant anatomic abnormalities.    At this point, I do not believe that the patient's current tyrosine kinase inhibitors are causing direct kidney damage.  Although these medications can on very rare occasions cause an interstitial nephritis, again this would be typically associated with at least hematuria, but are more classically associated with proteinuria.    Will continue with workup especially in conjunction with worsening edema, will check a 2D echocardiogram to ensure adequate and appropriate cardiac function.

## 2024-08-09 NOTE — PROGRESS NOTES
[facility] [place]  Faisal De La Cruz DO    Name: Andrea Wiley  YOB: 1955      Assessment/Plan:    Stage 3b chronic kidney disease (HCC)  Lab Results   Component Value Date    EGFR 36 08/08/2024    EGFR 35 07/15/2024    EGFR 36 05/06/2024    CREATININE 1.83 (H) 08/08/2024    CREATININE 1.86 (H) 07/15/2024    CREATININE 1.82 (H) 05/06/2024     Patient appears to have a new baseline creatinine between 1.8-1.9 mg/dL.  Etiology at this time is unclear.  Patient's urine studies show no significant blood or protein.  There were no other specific clues available from other blood work as well.  Patient had a recent kidney ultrasound which revealed no significant anatomic abnormalities.    At this point, I do not believe that the patient's current tyrosine kinase inhibitors are causing direct kidney damage.  Although these medications can on very rare occasions cause an interstitial nephritis, again this would be typically associated with at least hematuria, but are more classically associated with proteinuria.    Will continue with workup especially in conjunction with worsening edema, will check a 2D echocardiogram to ensure adequate and appropriate cardiac function.    Hypertension  Patient's blood pressure controlled at this time, continue with low-sodium diet, use of torsemide as indicated.    Type 2 diabetes mellitus, without long-term current use of insulin ([facility])  Patient's blood sugars are controlled at this time, continue with dietary management at this time.    Lab Results   Component Value Date    HGBA1C 6.1 (H) 08/08/2024       Bilateral leg edema  Reviewed low-sodium diet, patient certainly has room to improve here.  Continue with torsemide in the meantime to assist with controlling excessive bilateral lower extremity edema.         Problem List Items Addressed This Visit          Cardiovascular and Mediastinum    Hypertension     Patient's blood pressure  controlled at this time, continue with low-sodium diet, use of torsemide as indicated.         Relevant Medications    torsemide (DEMADEX) 20 mg tablet       Endocrine    Type 2 diabetes mellitus, without long-term current use of insulin (HCC)     Patient's blood sugars are controlled at this time, continue with dietary management at this time.    Lab Results   Component Value Date    HGBA1C 6.1 (H) 08/08/2024               Genitourinary    Stage 3b chronic kidney disease (HCC) - Primary     Lab Results   Component Value Date    EGFR 36 08/08/2024    EGFR 35 07/15/2024    EGFR 36 05/06/2024    CREATININE 1.83 (H) 08/08/2024    CREATININE 1.86 (H) 07/15/2024    CREATININE 1.82 (H) 05/06/2024     Patient appears to have a new baseline creatinine between 1.8-1.9 mg/dL.  Etiology at this time is unclear.  Patient's urine studies show no significant blood or protein.  There were no other specific clues available from other blood work as well.  Patient had a recent kidney ultrasound which revealed no significant anatomic abnormalities.    At this point, I do not believe that the patient's current tyrosine kinase inhibitors are causing direct kidney damage.  Although these medications can on very rare occasions cause an interstitial nephritis, again this would be typically associated with at least hematuria, but are more classically associated with proteinuria.    Will continue with workup especially in conjunction with worsening edema, will check a 2D echocardiogram to ensure adequate and appropriate cardiac function.         Relevant Medications    torsemide (DEMADEX) 20 mg tablet    Other Relevant Orders    Echo complete w/ contrast if indicated    Basic metabolic panel    Magnesium       Surgery/Wound/Pain    Bilateral leg edema     Reviewed low-sodium diet, patient certainly has room to improve here.  Continue with torsemide in the meantime to assist with controlling excessive bilateral lower extremity edema.          Relevant Medications    torsemide (DEMADEX) 20 mg tablet       Thank you for allowing us to participate in the care of your patient.  We will see him back for regular appointment in approximately 4 months.  Sooner if clinically indicated.  Will be checking a 2D echocardiogram to further evaluate cardiac function, we will be repeating kidney function along with electrolyte evaluation.    A total of 70 minutes required for this initial consultation.    Subjective:      Patient ID: Andrea Wiley is a 69 y.o. male.    Patient presents for initial evaluation.    We reviewed labs in detail, most recent creatinine noted to be 1.83 mg/dL with an eGFR 36    There were no significant electrolyte abnormalities noted.  Patient is taking all medications as prescribed with no specific side effects and denies use of nonsteroid anti-inflammatory medications.              The following portions of the patient's history were reviewed and updated as appropriate: allergies, current medications, past family history, past medical history, past social history, past surgical history and problem list.    Review of Systems   All other systems reviewed and are negative.        Social History     Socioeconomic History    Marital status: /Civil Union     Spouse name: None    Number of children: None    Years of education: None    Highest education level: None   Occupational History    None   Tobacco Use    Smoking status: Never     Passive exposure: Never    Smokeless tobacco: Never   Vaping Use    Vaping status: Never Used   Substance and Sexual Activity    Alcohol use: No    Drug use: No    Sexual activity: None   Other Topics Concern    None   Social History Narrative    None     Social Determinants of Health     Financial Resource Strain: Not on file   Food Insecurity: No Food Insecurity (5/9/2024)    Hunger Vital Sign     Worried About Running Out of Food in the Last Year: Never true     Ran Out of Food in the Last Year: Never  true   Transportation Needs: No Transportation Needs (5/9/2024)    PRAPARE - Transportation     Lack of Transportation (Medical): No     Lack of Transportation (Non-Medical): No   Physical Activity: Not on file   Stress: Not on file   Social Connections: Not on file   Intimate Partner Violence: Not on file   Housing Stability: Unknown (5/9/2024)    Housing Stability Vital Sign     Unable to Pay for Housing in the Last Year: No     Number of Times Moved in the Last Year: Not on file     Homeless in the Last Year: No     Past Medical History:   Diagnosis Date    Cancer (HCC)     Diabetes mellitus (HCC)     High blood pressure     Hypertension     Lung cancer (HCC)     Lung disorder     Urinary problem      Past Surgical History:   Procedure Laterality Date    BRAIN SURGERY      CHOLECYSTECTOMY      LUNG CANCER SURGERY  05/2019    partial lung removed     TONSILLECTOMY         Current Outpatient Medications:     apixaban (Eliquis) 5 mg, Take 5 mg by mouth 2 (two) times a day, Disp: , Rfl:     azithromycin (ZITHROMAX) 500 MG tablet, Take 500 mg by mouth 2 (two) times a day, Disp: , Rfl:     Osimertinib Mesylate 80 MG TABS, Take 80 mg by mouth in the morning, Disp: , Rfl:     Tepotinib HCl (Tepmetko) 225 MG TABS, Take 225 mg by mouth in the morning, Disp: , Rfl:     torsemide (DEMADEX) 20 mg tablet, Take 1 tablet (20 mg total) by mouth daily, Disp: 90 tablet, Rfl: 1    metFORMIN (GLUCOPHAGE) 1000 MG tablet, Take 1 tablet (1,000 mg total) by mouth 2 (two) times a day with meals for 30 days (Patient taking differently: Take 1,000 mg by mouth 1/2 tab. daily PRN), Disp: 60 tablet, Rfl: 1    Lab Results   Component Value Date    SODIUM 142 08/08/2024    K 4.4 08/08/2024     (H) 08/08/2024    CO2 27 08/08/2024    AGAP 6 08/08/2024    BUN 19 08/08/2024    CREATININE 1.83 (H) 08/08/2024    GLUC 125 07/15/2024    GLUF 116 (H) 08/08/2024    CALCIUM 7.9 (L) 08/08/2024    AST 17 08/08/2024    ALT 16 08/08/2024    ALKPHOS 89  "08/08/2024    TP 5.4 (L) 08/08/2024    TBILI 0.47 08/08/2024    EGFR 36 08/08/2024     Lab Results   Component Value Date    WBC 5.42 08/08/2024    HGB 13.2 08/08/2024    HCT 41.1 08/08/2024    MCV 92 08/08/2024     (L) 08/08/2024     Lab Results   Component Value Date    CHOLESTEROL 110 08/08/2024    CHOLESTEROL 151 06/17/2018    CHOLESTEROL 152 06/17/2018     Lab Results   Component Value Date    HDL 36 (L) 08/08/2024    HDL 31 (L) 06/17/2018    HDL 32 (L) 06/17/2018     Lab Results   Component Value Date    LDLCALC 63 08/08/2024    LDLCALC 89 06/17/2018    LDLCALC 90 06/17/2018     Lab Results   Component Value Date    TRIG 54 08/08/2024    TRIG 154 (H) 06/17/2018    TRIG 150 06/17/2018     No results found for: \"CHOLHDL\"  Lab Results   Component Value Date    HIA7AMCZZTDR 1.936 06/17/2018     Lab Results   Component Value Date    PTH 39.2 08/08/2024    CALCIUM 7.9 (L) 08/08/2024    PHOS 3.0 08/08/2024     No results found for: \"SPEP\", \"UPEP\"  No results found for: \"MICROALBUR\", \"[id number]\"        Objective:      /64 (BP Location: Left arm, Patient Position: Sitting, Cuff Size: Standard)   Pulse 57   Temp (!) 97.1 °F (36.2 °C) (Temporal)   Ht 5' 4\" (1.626 m)   Wt 74.8 kg (165 lb)   SpO2 98%   BMI 28.32 kg/m²          Physical Exam  Vitals reviewed.   Constitutional:       General: He is not in acute distress.     Appearance: Normal appearance.   HENT:      Head: Normocephalic and atraumatic.      Right Ear: External ear normal.      Left Ear: External ear normal.   Eyes:      Conjunctiva/sclera: Conjunctivae normal.   Cardiovascular:      Rate and Rhythm: Normal rate and regular rhythm.      Heart sounds: Normal heart sounds.   Pulmonary:      Effort: No respiratory distress.      Breath sounds: No wheezing.   Abdominal:      Palpations: Abdomen is soft.   Skin:     General: Skin is warm and dry.   Neurological:      General: No focal deficit present.      Mental Status: He is alert and " oriented to person, place, and time.   Psychiatric:         Mood and Affect: Mood normal.         Behavior: Behavior normal.

## 2024-08-14 NOTE — ASSESSMENT & PLAN NOTE
Patient's blood sugars are controlled at this time, continue with dietary management at this time.    Lab Results   Component Value Date    HGBA1C 6.1 (H) 08/08/2024

## 2024-08-14 NOTE — ASSESSMENT & PLAN NOTE
Patient's blood pressure controlled at this time, continue with low-sodium diet, use of torsemide as indicated.

## 2024-08-14 NOTE — ASSESSMENT & PLAN NOTE
Reviewed low-sodium diet, patient certainly has room to improve here.  Continue with torsemide in the meantime to assist with controlling excessive bilateral lower extremity edema.

## 2024-09-18 ENCOUNTER — RA CDI HCC (OUTPATIENT)
Dept: OTHER | Facility: HOSPITAL | Age: 69
End: 2024-09-18

## 2024-09-18 NOTE — PROGRESS NOTES
HCC coding opportunities          Chart Reviewed number of suggestions sent to Provider: 2     Patients Insurance     Medicare Insurance: Medicare        E11.22  E11.36

## 2024-09-25 ENCOUNTER — OFFICE VISIT (OUTPATIENT)
Dept: FAMILY MEDICINE CLINIC | Facility: CLINIC | Age: 69
End: 2024-09-25
Payer: MEDICARE

## 2024-09-25 VITALS
HEART RATE: 56 BPM | WEIGHT: 167.6 LBS | HEIGHT: 64 IN | OXYGEN SATURATION: 98 % | RESPIRATION RATE: 18 BRPM | SYSTOLIC BLOOD PRESSURE: 124 MMHG | DIASTOLIC BLOOD PRESSURE: 70 MMHG | BODY MASS INDEX: 28.61 KG/M2 | TEMPERATURE: 98.2 F

## 2024-09-25 DIAGNOSIS — E55.9 VITAMIN D DEFICIENCY: ICD-10-CM

## 2024-09-25 DIAGNOSIS — Z12.12 SCREENING FOR COLORECTAL CANCER: ICD-10-CM

## 2024-09-25 DIAGNOSIS — E79.0 ELEVATED URIC ACID IN BLOOD: ICD-10-CM

## 2024-09-25 DIAGNOSIS — L03.032 PARONYCHIA OF GREAT TOE OF LEFT FOOT: ICD-10-CM

## 2024-09-25 DIAGNOSIS — Z12.11 SCREENING FOR COLORECTAL CANCER: ICD-10-CM

## 2024-09-25 DIAGNOSIS — R60.0 BILATERAL LEG EDEMA: ICD-10-CM

## 2024-09-25 DIAGNOSIS — D69.6 THROMBOCYTOPENIA (HCC): ICD-10-CM

## 2024-09-25 DIAGNOSIS — E11.40 TYPE 2 DIABETES MELLITUS WITH DIABETIC NEUROPATHY, WITHOUT LONG-TERM CURRENT USE OF INSULIN (HCC): Primary | ICD-10-CM

## 2024-09-25 DIAGNOSIS — Z11.59 NEED FOR HEPATITIS C SCREENING TEST: ICD-10-CM

## 2024-09-25 DIAGNOSIS — I10 PRIMARY HYPERTENSION: ICD-10-CM

## 2024-09-25 DIAGNOSIS — C34.92 MALIGNANT NEOPLASM OF LEFT LUNG, UNSPECIFIED PART OF LUNG (HCC): ICD-10-CM

## 2024-09-25 DIAGNOSIS — N18.32 STAGE 3B CHRONIC KIDNEY DISEASE (HCC): ICD-10-CM

## 2024-09-25 PROCEDURE — 99214 OFFICE O/P EST MOD 30 MIN: CPT | Performed by: FAMILY MEDICINE

## 2024-09-25 PROCEDURE — G2211 COMPLEX E/M VISIT ADD ON: HCPCS | Performed by: FAMILY MEDICINE

## 2024-09-25 RX ORDER — DOXYCYCLINE 100 MG/1
CAPSULE ORAL
Qty: 14 CAPSULE | Refills: 0 | Status: SHIPPED | OUTPATIENT
Start: 2024-09-25 | End: 2024-10-02

## 2024-09-25 NOTE — ASSESSMENT & PLAN NOTE
Patient is on chemotherapy for lung cancer.    Will continue to monitor labs.    Orders:    CBC and differential

## 2024-09-25 NOTE — ASSESSMENT & PLAN NOTE
Recommended to follow a low-sodium diet. keep legs elevated throughout the day.    Continue Torsemide 20 mg daily.  Orders:    Comprehensive metabolic panel

## 2024-09-25 NOTE — PROGRESS NOTES
Ambulatory Visit  Name: Andrea Wiley      : 1955      MRN: [id number]  Encounter Provider: Azalia Muñoz MD  Encounter Date: 2024   Encounter department: [facility]    Chief Complaint   Patient presents with    Follow-up     4 month f/u      Health Maintenance   Topic Date Due    Hepatitis C Screening  Never done    Pneumococcal Vaccine: 65+ Years (1 of 2 - PCV) Never done    Zoster Vaccine (1 of 2) Never done    Colorectal Cancer Screening  Never done    RSV Vaccine Age 60+ Years (1 - 1-dose 60+ series) Never done    COVID-19 Vaccine ( -  season) 2024    Influenza Vaccine (1) 2024    HEMOGLOBIN A1C  2025    Diabetic Foot Exam  2025    Fall Risk  2025    Medicare Annual Wellness Visit (AWV)  2025    Kidney Health Evaluation: GFR  2025    Kidney Health Evaluation: Albumin/Creatinine Ratio  2025    Depression Screening  2025    DM Eye Exam  05/15/2026    RSV Vaccine age 0-20 Months  Aged Out    HIB Vaccine  Aged Out    IPV Vaccine  Aged Out    Hepatitis A Vaccine  Aged Out    Meningococcal ACWY Vaccine  Aged Out    HPV Vaccine  Aged Out       Assessment & Plan  Primary hypertension    BP remains stable.  Continue to follow a low sodium diet.  Take Torsemide 20 mg daily.         Type 2 diabetes mellitus with diabetic neuropathy, without long-term current use of insulin (HCC)    Lab Results   Component Value Date    HGBA1C 6.1 (H) 2024     Type 2 DM - diet controlled.  Continue to monitor blood sugar at home.  Check eye exam by ophthalmology annually.    Follow-up with podiatry for routine foot care.    Orders:    Comprehensive metabolic panel    Stage 3b chronic kidney disease (HCC)  Lab Results   Component Value Date    EGFR 36 2024    EGFR 35 07/15/2024    EGFR 36 2024    CREATININE 1.83 (H) 2024    CREATININE 1.86 (H) 07/15/2024    CREATININE 1.82 (H) 2024     Patient was  evaluated by nephrology Dr. De La Cruz in August 2024.    Recommended patient to stay well-hydrated, avoid NSAIDs, nephrotoxins.    Follow-up with nephrology in December.    Orders:    Comprehensive metabolic panel    Thrombocytopenia (HCC)  Patient is on chemotherapy for lung cancer.    Will continue to monitor labs.    Orders:    CBC and differential    Bilateral leg edema  Recommended to follow a low-sodium diet. keep legs elevated throughout the day.    Continue Torsemide 20 mg daily.  Orders:    Comprehensive metabolic panel    Vitamin D deficiency  Take Vit D 5000 IU daily.         Malignant neoplasm of left lung, unspecified part of lung (HCC)  Patient follows with oncology Dr. Paulino at [facility] every 3 months.    Continue chemotherapy as per oncology.         Paronychia of great toe of left foot  Discussed skin care with patient.    Will start Doxycycline 100 mg 1 capsule twice daily with food for 1 week.    Reviewed side effects.  Recommended to schedule follow-up visit with podiatry.    Orders:    doxycycline hyclate (VIBRAMYCIN) 100 mg capsule; Take 1 caps. twice daily with food    Elevated uric acid in blood  Blood work done in August 2024 showed elevated uric acid level at 8.6.    Recommended to repeat uric acid level with next blood work.  Advised patient to discuss labs with oncology and nephrology.  Orders:    Uric acid    Screening for colorectal cancer  Patient states that he had colonoscopy done in [place] 2 years ago. Asked patient to obtain colonoscopy report.       Need for hepatitis C screening test    Orders:    Hepatitis C Antibody; Future      Depression Screening and Follow-up Plan: Patient was screened for depression during today's encounter. They screened negative with a PHQ-2 score of 0.      Schedule follow-up visit in 6 months.    History of Present Illness     HPI    Patient presents for 4 month follow-up visit.    PMHx: HTN, Type 2 DM, CKD stage 3, Vit D  deficiency, lung adenocarcinoma diagnosed in 1/2019, S/P ARMEN lobectomy, with en- bloc left lower lobe wedge resection, developed 3 brain metastases in November 2021, S/P craniotomy with frontal lesion excision in June 2022, DVT L LE in 12/2023, BPH.     Reviewed current medications, last blood test results from August 2024.    Patient states that he feels well overall.    HTN - denies chest pain, shortness of breath, dizziness.    Blood pressure remains stable.    Type 2 DM - diet controlled.  Hb A1C  6.1 from August 2024.    Fasting blood sugar at home ranges .      CKD stage 3 -  patient was evaluated by nephrology Dr. De La Cruz in August 2024.    No changes were made to medical regimen.    Patient reports bilateral leg edema.  Takes Torsemide 20 mg daily.    Swelling of left hand improved.    Lung CA - followed by oncology Dr. Paulino at [facility] [place] every 3 months, was seen yesterday.   Continues taking Tepmetko and Tagrisso daily.     Patient was seen by podiatrist in [place] 3 weeks ago for infected left great toe, was prescribed antibiotics and Silver   Sulfadiazide  1% cream.    He still has some redness around his left great toe toenail and purulent drainage.    No fever or chills.    Patient was diagnosed with DVT L LE in December 2023.  Takes Eliquis 5 mg twice daily.    According to patient colonoscopy was performed in [place] 2 years ago.    He received Pneumonia vaccination at oncology office.    Denies tobacco, alcohol use.      Review of Systems   Constitutional:  Negative for activity change, appetite change, chills, fatigue and fever.   HENT:  Negative for congestion, ear pain, hearing loss, sore throat and trouble swallowing.    Eyes: Negative.    Respiratory:  Negative for cough and shortness of breath.    Cardiovascular:  Positive for leg swelling. Negative for chest pain and palpitations.   Gastrointestinal:  Negative for blood in stool, constipation, diarrhea,  "nausea and vomiting.   Genitourinary:  Negative for difficulty urinating, dysuria and hematuria.        Nocturia x 2-3   Musculoskeletal:  Positive for arthralgias (hand joints). Negative for back pain, gait problem and joint swelling.   Skin:  Negative for rash.   Neurological:  Negative for dizziness, syncope and headaches.   Hematological: Negative.    Psychiatric/Behavioral:  Negative for dysphoric mood and sleep disturbance. The patient is not nervous/anxious.            Objective     /70 (BP Location: Left arm, Patient Position: Sitting, Cuff Size: Standard)   Pulse 56   Temp 98.2 °F (36.8 °C) (Temporal)   Resp 18   Ht 5' 4\" (1.626 m)   Wt 76 kg (167 lb 9.6 oz)   SpO2 98%   BMI 28.77 kg/m²     Physical Exam  Vitals and nursing note reviewed.   Constitutional:       Appearance: Normal appearance.   HENT:      Head: Normocephalic and atraumatic.   Eyes:      Conjunctiva/sclera: Conjunctivae normal.      Pupils: Pupils are equal, round, and reactive to light.   Neck:      Vascular: No carotid bruit.   Cardiovascular:      Rate and Rhythm: Regular rhythm. Bradycardia present.      Heart sounds: No murmur heard.     Comments: BL LE edema 1+  Pulmonary:      Effort: Pulmonary effort is normal.      Breath sounds: Normal breath sounds.   Abdominal:      General: Bowel sounds are normal. There is no distension.      Palpations: Abdomen is soft.      Tenderness: There is no abdominal tenderness.   Musculoskeletal:         General: No swelling.      Cervical back: Normal range of motion and neck supple.      Comments: Arthritic changes in hand joints   Skin:     General: Skin is warm and dry.      Findings: No rash.      Comments: Left great toe is mildly swollen, erythematous, tender around nail bed.  There is some purulent discharge.    Left hand is mildly swollen   Neurological:      General: No focal deficit present.      Mental Status: He is alert.   Psychiatric:         Mood and Affect: Mood normal. "

## 2024-09-25 NOTE — ASSESSMENT & PLAN NOTE
Lab Results   Component Value Date    HGBA1C 6.1 (H) 08/08/2024     Type 2 DM - diet controlled.  Continue to monitor blood sugar at home.  Check eye exam by ophthalmology annually.    Follow-up with podiatry for routine foot care.    Orders:    Comprehensive metabolic panel

## 2024-09-25 NOTE — ASSESSMENT & PLAN NOTE
Lab Results   Component Value Date    EGFR 36 08/08/2024    EGFR 35 07/15/2024    EGFR 36 05/06/2024    CREATININE 1.83 (H) 08/08/2024    CREATININE 1.86 (H) 07/15/2024    CREATININE 1.82 (H) 05/06/2024     Patient was evaluated by nephrology Dr. De La Cruz in August 2024.    Recommended patient to stay well-hydrated, avoid NSAIDs, nephrotoxins.    Follow-up with nephrology in December.    Orders:    Comprehensive metabolic panel

## 2024-09-26 PROBLEM — E79.0 ELEVATED URIC ACID IN BLOOD: Status: ACTIVE | Noted: 2024-09-26

## 2024-09-26 NOTE — ASSESSMENT & PLAN NOTE
Discussed skin care with patient.    Will start Doxycycline 100 mg 1 capsule twice daily with food for 1 week.    Reviewed side effects.  Recommended to schedule follow-up visit with podiatry.    Orders:    doxycycline hyclate (VIBRAMYCIN) 100 mg capsule; Take 1 caps. twice daily with food

## 2024-09-26 NOTE — ASSESSMENT & PLAN NOTE
Blood work done in August 2024 showed elevated uric acid level at 8.6.    Recommended to repeat uric acid level with next blood work.  Advised patient to discuss labs with oncology and nephrology.  Orders:    Uric acid

## 2024-10-18 ENCOUNTER — HOSPITAL ENCOUNTER (OUTPATIENT)
Dept: ULTRASOUND IMAGING | Facility: HOSPITAL | Age: 69
End: 2024-10-18
Payer: MEDICARE

## 2024-10-18 DIAGNOSIS — R94.5 NONSPECIFIC ABNORMAL RESULTS OF LIVER FUNCTION STUDY: ICD-10-CM

## 2024-10-18 PROCEDURE — 76700 US EXAM ABDOM COMPLETE: CPT

## 2024-10-29 ENCOUNTER — HOSPITAL ENCOUNTER (EMERGENCY)
Facility: HOSPITAL | Age: 69
Discharge: HOME | End: 2024-10-29
Attending: EMERGENCY MEDICINE
Payer: MEDICARE

## 2024-10-29 VITALS
OXYGEN SATURATION: 100 % | HEART RATE: 59 BPM | TEMPERATURE: 97.3 F | DIASTOLIC BLOOD PRESSURE: 65 MMHG | RESPIRATION RATE: 19 BRPM | SYSTOLIC BLOOD PRESSURE: 145 MMHG

## 2024-10-29 DIAGNOSIS — L30.9 DERMATITIS: Primary | ICD-10-CM

## 2024-10-29 LAB
ALBUMIN SERPL BCG-MCNC: 3.1 G/DL (ref 3.5–5)
ALP SERPL-CCNC: 145 U/L (ref 34–104)
ALT SERPL W P-5'-P-CCNC: 32 U/L (ref 7–52)
ANION GAP SERPL CALCULATED.3IONS-SCNC: 5 MMOL/L (ref 4–13)
AST SERPL W P-5'-P-CCNC: 26 U/L (ref 13–39)
BASOPHILS # BLD AUTO: 0.02 THOUSANDS/ΜL (ref 0–0.1)
BASOPHILS NFR BLD AUTO: 0 % (ref 0–1)
BILIRUB DIRECT SERPL-MCNC: 0.1 MG/DL (ref 0–0.2)
BILIRUB SERPL-MCNC: 0.36 MG/DL (ref 0.2–1)
BUN SERPL-MCNC: 23 MG/DL (ref 5–25)
CALCIUM SERPL-MCNC: 7.9 MG/DL (ref 8.4–10.2)
CHLORIDE SERPL-SCNC: 106 MMOL/L (ref 96–108)
CO2 SERPL-SCNC: 30 MMOL/L (ref 21–32)
CREAT SERPL-MCNC: 1.77 MG/DL (ref 0.6–1.3)
EOSINOPHIL # BLD AUTO: 0.18 THOUSAND/ΜL (ref 0–0.61)
EOSINOPHIL NFR BLD AUTO: 4 % (ref 0–6)
ERYTHROCYTE [DISTWIDTH] IN BLOOD BY AUTOMATED COUNT: 14 % (ref 11.6–15.1)
GFR SERPL CREATININE-BSD FRML MDRD: 37 ML/MIN/1.73SQ M
GLUCOSE SERPL-MCNC: 138 MG/DL (ref 65–140)
HCT VFR BLD AUTO: 37.2 % (ref 36.5–49.3)
HGB BLD-MCNC: 12.3 G/DL (ref 12–17)
IMM GRANULOCYTES # BLD AUTO: 0.01 THOUSAND/UL (ref 0–0.2)
IMM GRANULOCYTES NFR BLD AUTO: 0 % (ref 0–2)
LYMPHOCYTES # BLD AUTO: 0.81 THOUSANDS/ΜL (ref 0.6–4.47)
LYMPHOCYTES NFR BLD AUTO: 16 % (ref 14–44)
MCH RBC QN AUTO: 30 PG (ref 26.8–34.3)
MCHC RBC AUTO-ENTMCNC: 33.1 G/DL (ref 31.4–37.4)
MCV RBC AUTO: 91 FL (ref 82–98)
MONOCYTES # BLD AUTO: 0.33 THOUSAND/ΜL (ref 0.17–1.22)
MONOCYTES NFR BLD AUTO: 6 % (ref 4–12)
NEUTROPHILS # BLD AUTO: 3.81 THOUSANDS/ΜL (ref 1.85–7.62)
NEUTS SEG NFR BLD AUTO: 74 % (ref 43–75)
NRBC BLD AUTO-RTO: 0 /100 WBCS
PLATELET # BLD AUTO: 131 THOUSANDS/UL (ref 149–390)
PMV BLD AUTO: 11 FL (ref 8.9–12.7)
POTASSIUM SERPL-SCNC: 3.9 MMOL/L (ref 3.5–5.3)
PROT SERPL-MCNC: 5.5 G/DL (ref 6.4–8.4)
RBC # BLD AUTO: 4.1 MILLION/UL (ref 3.88–5.62)
SODIUM SERPL-SCNC: 141 MMOL/L (ref 135–147)
WBC # BLD AUTO: 5.16 THOUSAND/UL (ref 4.31–10.16)

## 2024-10-29 PROCEDURE — 99284 EMERGENCY DEPT VISIT MOD MDM: CPT | Performed by: EMERGENCY MEDICINE

## 2024-10-29 PROCEDURE — 87040 BLOOD CULTURE FOR BACTERIA: CPT | Performed by: EMERGENCY MEDICINE

## 2024-10-29 PROCEDURE — 99283 EMERGENCY DEPT VISIT LOW MDM: CPT

## 2024-10-29 PROCEDURE — 36415 COLL VENOUS BLD VENIPUNCTURE: CPT | Performed by: EMERGENCY MEDICINE

## 2024-10-29 PROCEDURE — 80048 BASIC METABOLIC PNL TOTAL CA: CPT | Performed by: EMERGENCY MEDICINE

## 2024-10-29 PROCEDURE — 85025 COMPLETE CBC W/AUTO DIFF WBC: CPT | Performed by: EMERGENCY MEDICINE

## 2024-10-29 PROCEDURE — 80076 HEPATIC FUNCTION PANEL: CPT | Performed by: EMERGENCY MEDICINE

## 2024-10-29 RX ORDER — DOXYCYCLINE 100 MG/1
100 CAPSULE ORAL EVERY 12 HOURS SCHEDULED
Qty: 20 CAPSULE | Refills: 0 | Status: SHIPPED | OUTPATIENT
Start: 2024-10-29 | End: 2024-11-08

## 2024-10-29 RX ORDER — DOXYCYCLINE 100 MG/1
100 CAPSULE ORAL ONCE
Status: COMPLETED | OUTPATIENT
Start: 2024-10-29 | End: 2024-10-29

## 2024-10-29 RX ADMIN — DOXYCYCLINE HYCLATE 100 MG: 100 CAPSULE ORAL at 13:58

## 2024-10-29 NOTE — ED PROVIDER NOTES
Time reflects when diagnosis was documented in both MDM as applicable and the Disposition within this note       Time User Action Codes Description Comment    10/29/2024  2:13 PM Rich Richardson Add [L30.9] Dermatitis           ED Disposition       ED Disposition   Discharge    Condition   Stable    Date/Time   Tue Oct 29, 2024  2:22 PM    Comment   Andrea Wiley discharge to home/self care.                   Assessment & Plan       Medical Decision Making  Problems Addressed:  Dermatitis: acute illness or injury     Details: In distribution c/w photosensitivity. Not currently infected but given open skin and immunocompromise from chemo will empirically treat with doxy.     Amount and/or Complexity of Data Reviewed  Labs: ordered.    Risk  Prescription drug management.             Medications   doxycycline hyclate (VIBRAMYCIN) capsule 100 mg (100 mg Oral Given 10/29/24 8588)       ED Risk Strat Scores                           SBIRT 20yo+      Flowsheet Row Most Recent Value   Initial Alcohol Screen: US AUDIT-C     1. How often do you have a drink containing alcohol? 0 Filed at: 10/29/2024 1323   2. How many drinks containing alcohol do you have on a typical day you are drinking?  0 Filed at: 10/29/2024 1323   3b. FEMALE Any Age, or MALE 65+: How often do you have 4 or more drinks on one occassion? 0 Filed at: 10/29/2024 1323   Audit-C Score 0 Filed at: 10/29/2024 1323   ANNABEL: How many times in the past year have you...    Used an illegal drug or used a prescription medication for non-medical reasons? Never Filed at: 10/29/2024 1323                            History of Present Illness       Chief Complaint   Patient presents with    Rash    Hand Swelling     Pt reports going fishing 3 weeks ago, developing a rash and swelling of hands shortly thereafter. Swelling has progressed and boils present. Pt is currently taking 2 PO chemo meds and oncologist concerned for possible infection.        Past Medical History:    Diagnosis Date    Cancer (HCC)     Diabetes mellitus (HCC)     High blood pressure     Hypertension     Lung cancer (HCC)     Lung disorder     Urinary problem       Past Surgical History:   Procedure Laterality Date    BRAIN SURGERY      CHOLECYSTECTOMY      LUNG CANCER SURGERY  05/2019    partial lung removed     TONSILLECTOMY        Family History   Family history unknown: Yes      Social History     Tobacco Use    Smoking status: Never     Passive exposure: Never    Smokeless tobacco: Never   Vaping Use    Vaping status: Never Used   Substance Use Topics    Alcohol use: No    Drug use: No      E-Cigarette/Vaping    E-Cigarette Use Never User       E-Cigarette/Vaping Substances      I have reviewed and agree with the history as documented.     70 yo male with 3 weeks of previously pruritic rash on lateral surface of both hands which started [place] Pruritus previously alleviated with allegra, now resolved. Now c/o mild pain and swelling in the affected areas. No systemic illness. Is on chemo; was sent to ED by oncologist over concern for possible vibrio vulnificus infection. Additional history from daughter at bedside.         Review of Systems   Constitutional:  Negative for chills, fatigue and fever.   Gastrointestinal:  Negative for diarrhea and vomiting.   Skin:  Positive for rash.   Neurological:  Negative for dizziness, tremors, seizures, syncope, facial asymmetry, speech difficulty, weakness, light-headedness, numbness and headaches.           Objective       ED Triage Vitals [10/29/24 1220]   Temperature Pulse Blood Pressure Respirations SpO2 Patient Position - Orthostatic VS   (!) 97.3 °F (36.3 °C) 59 145/65 19 100 % Sitting      Temp Source Heart Rate Source BP Location FiO2 (%) Pain Score    Temporal Monitor Left arm -- --      Vitals      Date and Time Temp Pulse SpO2 Resp BP Pain Score FACES Pain Rating User   10/29/24 1220 97.3 °F  (36.3 °C) 59 100 % 19 145/65 -- -- GP            Physical Exam  Vitals and nursing note reviewed.   Constitutional:       General: He is not in acute distress.     Appearance: He is well-developed. He is not ill-appearing, toxic-appearing or diaphoretic.   HENT:      Head: Normocephalic and atraumatic.      Mouth/Throat:      Mouth: Mucous membranes are moist.      Pharynx: Oropharynx is clear.   Eyes:      Conjunctiva/sclera: Conjunctivae normal.      Pupils: Pupils are equal, round, and reactive to light.   Neck:      Vascular: No JVD.   Cardiovascular:      Rate and Rhythm: Normal rate and regular rhythm.      Pulses: Normal pulses.      Heart sounds: Normal heart sounds. No murmur heard.     No friction rub. No gallop.   Pulmonary:      Effort: Pulmonary effort is normal. No respiratory distress.      Breath sounds: Normal breath sounds. No stridor. No wheezing or rales.   Abdominal:      General: There is no distension.      Palpations: Abdomen is soft.      Tenderness: There is no abdominal tenderness. There is no guarding or rebound.   Musculoskeletal:         General: No swelling, tenderness, deformity or signs of injury. Normal range of motion.      Cervical back: Normal range of motion and neck supple. No rigidity.   Skin:     General: Skin is warm and dry.      Capillary Refill: Capillary refill takes less than 2 seconds.      Coloration: Skin is not jaundiced or pale.      Findings: Rash present. No bruising or erythema.      Comments: See media file  Ulceration on R thumb. Distribution of rash c/w photosensitivity rash. No infection or drainage. No other skin breakdown or vesicles or bullae. No crepitus or fluctuance.    Neurological:      General: No focal deficit present.      Mental Status: He is alert and oriented to person, place, and time.      Cranial Nerves: No cranial nerve deficit.      Sensory: No sensory deficit.      Motor: No weakness or abnormal muscle tone.      Coordination:  Coordination normal.      Gait: Gait normal.         Results Reviewed       Procedure Component Value Units Date/Time    Blood culture #1 [[id number]] Collected: 10/29/24 1356    Lab Status: Preliminary result Specimen: Blood from Arm, Right Updated: 10/29/24 1902     Blood Culture Received in Microbiology Lab. Culture in Progress.    Blood culture #2 [[id number]] Collected: 10/29/24 1357    Lab Status: Preliminary result Specimen: Blood from Arm, Left Updated: 10/29/24 1902     Blood Culture Received in Microbiology Lab. Culture in Progress.    Basic metabolic panel [[id number]]  (Abnormal) Collected: 10/29/24 1356    Lab Status: Final result Specimen: Blood from Arm, Right Updated: 10/29/24 1422     Sodium 141 mmol/L      Potassium 3.9 mmol/L      Chloride 106 mmol/L      CO2 30 mmol/L      ANION GAP 5 mmol/L      BUN 23 mg/dL      Creatinine 1.77 mg/dL      Glucose 138 mg/dL      Calcium 7.9 mg/dL      eGFR 37 ml/min/1.73sq m     Narrative:      National Kidney Disease Foundation guidelines for Chronic Kidney Disease (CKD):     Stage 1 with normal or high GFR (GFR > 90 mL/min/1.73 square meters)    Stage 2 Mild CKD (GFR = 60-89 mL/min/1.73 square meters)    Stage 3A Moderate CKD (GFR = 45-59 mL/min/1.73 square meters)    Stage 3B Moderate CKD (GFR = 30-44 mL/min/1.73 square meters)    Stage 4 Severe CKD (GFR = 15-29 mL/min/1.73 square meters)    Stage 5 End Stage CKD (GFR <15 mL/min/1.73 square meters)  Note: GFR calculation is accurate only with a steady state creatinine    Hepatic function panel [[id number]]  (Abnormal) Collected: 10/29/24 1356    Lab Status: Final result Specimen: Blood from Arm, Right Updated: 10/29/24 1422     Total Bilirubin 0.36 mg/dL      Bilirubin, Direct 0.10 mg/dL      Alkaline Phosphatase 145 U/L      AST 26 U/L      ALT 32 U/L      Total Protein 5.5 g/dL      Albumin 3.1 g/dL     CBC and differential [[id number]]  (Abnormal) Collected: 10/29/24 1356    Lab Status: Final result Specimen:  Blood from Arm, Right Updated: 10/29/24 1411     WBC 5.16 Thousand/uL      RBC 4.10 Million/uL      Hemoglobin 12.3 g/dL      Hematocrit 37.2 %      MCV 91 fL      MCH 30.0 pg      MCHC 33.1 g/dL      RDW 14.0 %      MPV 11.0 fL      Platelets 131 Thousands/uL      nRBC 0 /100 WBCs      Segmented % 74 %      Immature Grans % 0 %      Lymphocytes % 16 %      Monocytes % 6 %      Eosinophils Relative 4 %      Basophils Relative 0 %      Absolute Neutrophils 3.81 Thousands/µL      Absolute Immature Grans 0.01 Thousand/uL      Absolute Lymphocytes 0.81 Thousands/µL      Absolute Monocytes 0.33 Thousand/µL      Eosinophils Absolute 0.18 Thousand/µL      Basophils Absolute 0.02 Thousands/µL             No orders to display       Procedures    ED Medication and Procedure Management   Prior to Admission Medications   Prescriptions Last Dose Informant Patient Reported? Taking?   Osimertinib Mesylate 80 MG TABS  Self Yes No   Sig: Take 80 mg by mouth in the morning   Tepotinib HCl (Tepmetko) 225 MG TABS  Self Yes No   Sig: Take 225 mg by mouth in the morning   apixaban (Eliquis) 5 mg  Self Yes No   Sig: Take 5 mg by mouth 2 (two) times a day   azithromycin (ZITHROMAX) 500 MG tablet  Self Yes No   Sig: Take 500 mg by mouth 2 (two) times a day   torsemide (DEMADEX) 20 mg tablet   No No   Sig: Take 1 tablet (20 mg total) by mouth daily      Facility-Administered Medications: None     Discharge Medication List as of 10/29/2024  2:23 PM        CONTINUE these medications which have NOT CHANGED    Details   apixaban (Eliquis) 5 mg Take 5 mg by mouth 2 (two) times a day, Historical Med      azithromycin (ZITHROMAX) 500 MG tablet Take 500 mg by mouth 2 (two) times a day, Historical Med      Osimertinib Mesylate 80 MG TABS Take 80 mg by mouth in the morning, Starting Wed 6/8/2022, Until Mon 12/9/2024, Historical Med      Tepotinib HCl (Tepmetko) 225 MG TABS Take 225 mg by mouth in the morning, Historical Med      torsemide (DEMADEX) 20  mg tablet Take 1 tablet (20 mg total) by mouth daily, Starting Fri 8/9/2024, Normal           No discharge procedures on file.  ED SEPSIS DOCUMENTATION   Time reflects when diagnosis was documented in both MDM as applicable and the Disposition within this note       Time User Action Codes Description Comment    10/29/2024  2:13 PM Rich Richardson Add [L30.9] Dermatitis                  Rich Richardson MD  10/29/24 9558

## 2024-11-03 LAB
BACTERIA BLD CULT: NORMAL
BACTERIA BLD CULT: NORMAL

## 2025-04-02 ENCOUNTER — OFFICE VISIT (OUTPATIENT)
Dept: FAMILY MEDICINE CLINIC | Facility: CLINIC | Age: 70
End: 2025-04-02
Payer: MEDICARE

## 2025-04-02 VITALS
HEART RATE: 50 BPM | RESPIRATION RATE: 18 BRPM | OXYGEN SATURATION: 100 % | BODY MASS INDEX: 28 KG/M2 | HEIGHT: 64 IN | TEMPERATURE: 97.9 F | WEIGHT: 164 LBS | DIASTOLIC BLOOD PRESSURE: 64 MMHG | SYSTOLIC BLOOD PRESSURE: 116 MMHG

## 2025-04-02 DIAGNOSIS — C34.92 MALIGNANT NEOPLASM OF LEFT LUNG, UNSPECIFIED PART OF LUNG (HCC): ICD-10-CM

## 2025-04-02 DIAGNOSIS — I10 PRIMARY HYPERTENSION: ICD-10-CM

## 2025-04-02 DIAGNOSIS — E78.6 LOW HDL (UNDER 40): ICD-10-CM

## 2025-04-02 DIAGNOSIS — E11.40 TYPE 2 DIABETES MELLITUS WITH DIABETIC NEUROPATHY, WITHOUT LONG-TERM CURRENT USE OF INSULIN (HCC): Primary | ICD-10-CM

## 2025-04-02 DIAGNOSIS — N18.32 STAGE 3B CHRONIC KIDNEY DISEASE (HCC): ICD-10-CM

## 2025-04-02 DIAGNOSIS — I82.4Y2 ACUTE DEEP VEIN THROMBOSIS (DVT) OF PROXIMAL VEIN OF LEFT LOWER EXTREMITY (HCC): ICD-10-CM

## 2025-04-02 LAB — SL AMB POCT HEMOGLOBIN AIC: 6.3 (ref ?–6.5)

## 2025-04-02 PROCEDURE — 83036 HEMOGLOBIN GLYCOSYLATED A1C: CPT | Performed by: FAMILY MEDICINE

## 2025-04-02 PROCEDURE — 99214 OFFICE O/P EST MOD 30 MIN: CPT | Performed by: FAMILY MEDICINE

## 2025-04-02 PROCEDURE — G2211 COMPLEX E/M VISIT ADD ON: HCPCS | Performed by: FAMILY MEDICINE

## 2025-04-02 NOTE — PROGRESS NOTES
Name: Andrea Wiley      : 1955      MRN: [id number]  Encounter Provider: Shabbir Montana MD  Encounter Date: 2025   Encounter department: [facility]  :  Assessment & Plan  Type 2 diabetes mellitus with diabetic neuropathy, without long-term current use of insulin (HCC)    Lab Results   Component Value Date    HGBA1C 6.3 2025     Diet control.   Orders:    POCT hemoglobin A1c    Malignant neoplasm of left lung, unspecified part of lung (HCC)  [facility] oncology in [place].        Acute deep vein thrombosis (DVT) of proximal vein of left lower extremity (HCC)  Continue eliquis 5mg bid per oncology.        Stage 3b chronic kidney disease (HCC)  Lab Results   Component Value Date    EGFR 37 10/29/2024    EGFR 36 2024    EGFR 35 07/15/2024    CREATININE 1.77 (H) 10/29/2024    CREATININE 1.83 (H) 2024    CREATININE 1.86 (H) 07/15/2024     Keep hydrated. Avoid motrin/ibuprofen/aleve NSAIDs nephrotoxic agents.          Primary hypertension  DASH diet.        Low HDL (under 40)  Recommend statins.   Orders:    Lipid Panel with Direct LDL reflex; Future      Recommend flu shot yearly.   Recommend PCV20.   Got covid19 shots. Refused booster.   Recommend shingrix.   Colonoscopy done in [place], up to date per pt.   RTO in 6 months.             Depression Screening and Follow-up Plan: Patient was screened for depression during today's encounter. They screened negative with a PHQ-2 score of 0.        History of Present Illness   HPI    PT is here by himself.      Type 2 DM - diet controlled.   Fasting blood sugar at home ranges .   Denies hypoglycemia.   Sometimes hands numbness when sleep.   [facility] eye doc yearly.   [facility] podiatry in [facility].     HTN - denies chest pain, shortness of breath, dizziness.    Blood pressure remains stable.     CKD stage 3 -  patient was evaluated by nephrology in [place].   Takes Torsemide 20 mg daily helped swelling of legs and left hand.      Lung CA - followed by  "oncology Dr. Paulino at [facility] every 3 months.   CT chest good in 2/2025 per pt.       Patient was diagnosed with DVT L LE in December 2023.  Takes Eliquis 5 mg twice daily per oncology.     No smoking.   Alcohol rarely.     Lives with wife. Does all ADL's.   Denies recent falls.   Denies depression.          Review of Systems   Constitutional:  Negative for appetite change, chills and fever.   HENT:  Negative for congestion, ear pain, sinus pain and sore throat.    Eyes:  Negative for discharge and itching.   Respiratory:  Negative for apnea, cough, chest tightness, shortness of breath and wheezing.    Cardiovascular:  Negative for chest pain, palpitations and leg swelling.   Gastrointestinal:  Negative for abdominal pain, anal bleeding, constipation, diarrhea, nausea and vomiting.   Endocrine: Negative for cold intolerance, heat intolerance and polyuria.   Genitourinary:  Negative for difficulty urinating and dysuria.   Musculoskeletal:  Negative for arthralgias, back pain and myalgias.   Skin:  Negative for rash.   Neurological:  Negative for dizziness and headaches.   Psychiatric/Behavioral:  Negative for agitation.        Objective   /64   Pulse (!) 50   Temp 97.9 °F (36.6 °C) (Tympanic)   Resp 18   Ht 5' 4\" (1.626 m)   Wt 74.4 kg (164 lb)   SpO2 100%   BMI 28.15 kg/m²      Physical Exam  Constitutional:       Appearance: He is well-developed.   HENT:      Head: Normocephalic and atraumatic.   Eyes:      General:         Right eye: No discharge.         Left eye: No discharge.      Conjunctiva/sclera: Conjunctivae normal.   Cardiovascular:      Rate and Rhythm: Normal rate and regular rhythm.      Pulses: no weak pulses.           Dorsalis pedis pulses are 2+ on the right side and 2+ on the left side.      Heart sounds: Normal heart sounds. No murmur heard.     No friction rub. No gallop.   Pulmonary:      Effort: Pulmonary effort is normal. No respiratory distress.      " Breath sounds: Normal breath sounds. No wheezing or rales.   Abdominal:      General: Bowel sounds are normal. There is no distension.      Palpations: Abdomen is soft.      Tenderness: There is no abdominal tenderness. There is no guarding.   Musculoskeletal:         General: Normal range of motion.      Cervical back: Normal range of motion and neck supple.      Right lower leg: No edema.      Left lower leg: No edema.   Feet:      Right foot:      Skin integrity: No ulcer, skin breakdown, erythema, warmth, callus or dry skin.      Left foot:      Skin integrity: No ulcer, skin breakdown, erythema, warmth, callus or dry skin.   Neurological:      Mental Status: He is alert.       Patient's shoes and socks removed.    Right Foot/Ankle   Right Foot Inspection  Skin Exam: skin normal and skin intact. No dry skin, no warmth, no callus, no erythema, no maceration, no abnormal color, no pre-ulcer, no ulcer and no callus.     Toe Exam: ROM and strength within normal limits.     Sensory   Monofilament testing: intact    Vascular  The right DP pulse is 2+.     Left Foot/Ankle  Left Foot Inspection  Skin Exam: skin normal and skin intact. No dry skin, no warmth, no erythema, no maceration, normal color, no pre-ulcer, no ulcer and no callus.     Toe Exam: ROM and strength within normal limits.     Sensory   Monofilament testing: intact    Vascular  The left DP pulse is 2+.     Assign Risk Category  No deformity present  No loss of protective sensation  No weak pulses  Risk: 0

## 2025-04-02 NOTE — ASSESSMENT & PLAN NOTE
Lab Results   Component Value Date    HGBA1C 6.3 04/02/2025     Diet control.   Orders:    POCT hemoglobin A1c

## 2025-04-02 NOTE — ASSESSMENT & PLAN NOTE
Lab Results   Component Value Date    EGFR 37 10/29/2024    EGFR 36 08/08/2024    EGFR 35 07/15/2024    CREATININE 1.77 (H) 10/29/2024    CREATININE 1.83 (H) 08/08/2024    CREATININE 1.86 (H) 07/15/2024     Keep hydrated. Avoid motrin/ibuprofen/aleve NSAIDs nephrotoxic agents.

## 2025-05-08 LAB — HBA1C MFR BLD HPLC: 6.3 %

## 2025-05-15 ENCOUNTER — OFFICE VISIT (OUTPATIENT)
Dept: NEPHROLOGY | Facility: CLINIC | Age: 70
End: 2025-05-15
Payer: MEDICARE

## 2025-05-15 VITALS
BODY MASS INDEX: 27.31 KG/M2 | WEIGHT: 160 LBS | HEART RATE: 52 BPM | SYSTOLIC BLOOD PRESSURE: 118 MMHG | HEIGHT: 64 IN | TEMPERATURE: 97.6 F | DIASTOLIC BLOOD PRESSURE: 70 MMHG | OXYGEN SATURATION: 99 %

## 2025-05-15 DIAGNOSIS — N18.32 STAGE 3B CHRONIC KIDNEY DISEASE (HCC): Primary | ICD-10-CM

## 2025-05-15 DIAGNOSIS — R60.0 BILATERAL LEG EDEMA: ICD-10-CM

## 2025-05-15 DIAGNOSIS — E88.09 HYPOALBUMINEMIA: ICD-10-CM

## 2025-05-15 PROCEDURE — 99214 OFFICE O/P EST MOD 30 MIN: CPT | Performed by: INTERNAL MEDICINE

## 2025-05-15 PROCEDURE — G2211 COMPLEX E/M VISIT ADD ON: HCPCS | Performed by: INTERNAL MEDICINE

## 2025-05-15 NOTE — ASSESSMENT & PLAN NOTE
Persist, continue with low-sodium diet, patient is taking torsemide 20 mg daily.  Despite this he continues to have +2-+3 bilateral lower extremity edema with the left side slightly worse than the right, which is chronic.

## 2025-05-15 NOTE — ASSESSMENT & PLAN NOTE
Lab Results   Component Value Date    EGFR 37 10/29/2024    EGFR 36 08/08/2024    EGFR 35 07/15/2024    CREATININE 1.77 (H) 10/29/2024    CREATININE 1.83 (H) 08/08/2024    CREATININE 1.86 (H) 07/15/2024     Patient's creatinine has improved from around 1.8 - 1.9 mg/dL now down to around 1.6 - 1.7 mg/dL with labs from earlier this calendar year.    Continue to avoid potential nephrotoxins.

## 2025-05-15 NOTE — PROGRESS NOTES
[facility]  Faisal De La Cruz DO    Name: Andrea Wiley  YOB: 1955      Assessment/Plan:    Stage 3b chronic kidney disease (HCC)  Lab Results   Component Value Date    EGFR 37 10/29/2024    EGFR 36 08/08/2024    EGFR 35 07/15/2024    CREATININE 1.77 (H) 10/29/2024    CREATININE 1.83 (H) 08/08/2024    CREATININE 1.86 (H) 07/15/2024     Patient's creatinine has improved from around 1.8 - 1.9 mg/dL now down to around 1.6 - 1.7 mg/dL with labs from earlier this calendar year.    Continue to avoid potential nephrotoxins.      Bilateral leg edema  Persist, continue with low-sodium diet, patient is taking torsemide 20 mg daily.  Despite this he continues to have +2-+3 bilateral lower extremity edema with the left side slightly worse than the right, which is chronic.    Hypoalbuminemia  Encouraged the patient to continue to increase protein intake, bilateral lower extremity edema may be improved with higher serum protein levels.  Patient will try to increase his egg intake from 2 eggs in the morning up to 3 eggs in the morning.  Additional protein supplementation as tolerated going forward.         Problem List Items Addressed This Visit          Genitourinary    Stage 3b chronic kidney disease (HCC) - Primary    Lab Results   Component Value Date    EGFR 37 10/29/2024    EGFR 36 08/08/2024    EGFR 35 07/15/2024    CREATININE 1.77 (H) 10/29/2024    CREATININE 1.83 (H) 08/08/2024    CREATININE 1.86 (H) 07/15/2024     Patient's creatinine has improved from around 1.8 - 1.9 mg/dL now down to around 1.6 - 1.7 mg/dL with labs from earlier this calendar year.    Continue to avoid potential nephrotoxins.           Relevant Orders    CBC and differential    Comprehensive metabolic panel    Albumin / creatinine urine ratio    Protein / creatinine ratio, urine    Urinalysis with microscopic    Magnesium    Phosphorus    PTH, intact    Vitamin D 25 hydroxy        Surgery/Wound/Pain    Bilateral leg edema    Persist, continue with low-sodium diet, patient is taking torsemide 20 mg daily.  Despite this he continues to have +2-+3 bilateral lower extremity edema with the left side slightly worse than the right, which is chronic.            Other    Hypoalbuminemia    Encouraged the patient to continue to increase protein intake, bilateral lower extremity edema may be improved with higher serum protein levels.  Patient will try to increase his egg intake from 2 eggs in the morning up to 3 eggs in the morning.  Additional protein supplementation as tolerated going forward.         Relevant Orders    Comprehensive metabolic panel       Patient stable for the renal standpoint, we will see him back for regular visit in about 6 months.  Advised him to increase protein intake, continue to avoid nephrotoxins.  Happy to see kidney function slightly improved compared to previous levels.  Anticipate continued improvement or stability in kidney function going forward.    Subjective:      Patient ID: Andrea Wiley is a 69 y.o. male.    Patient presents for follow up.    We reviewed labs in detail, most recent creatinine noted to be 1.7 mg/dL with an eGFR of around 40 ml/min.    There were no significant electrolyte abnormalities noted.  Patient is taking all medications as prescribed with no specific side effects and denies use of nonsteroid anti-inflammatory medications.              The following portions of the patient's history were reviewed and updated as appropriate: allergies, current medications, past family history, past medical history, past social history, past surgical history and problem list.    Review of Systems   All other systems reviewed and are negative.        Social History     Socioeconomic History    Marital status: /Civil Union     Spouse name: None    Number of children: None    Years of education: None    Highest education level: None   Occupational History     None   Tobacco Use    Smoking status: Never     Passive exposure: Never    Smokeless tobacco: Never   Vaping Use    Vaping status: Never Used   Substance and Sexual Activity    Alcohol use: No    Drug use: No    Sexual activity: None   Other Topics Concern    None   Social History Narrative    None     Social Drivers of Health     Financial Resource Strain: Not on file   Food Insecurity: No Food Insecurity (5/9/2024)    Nursing - Inadequate Food Risk Classification     Worried About Running Out of Food in the Last Year: Never true     Ran Out of Food in the Last Year: Never true     Ran Out of Food in the Last Year: Not on file   Transportation Needs: No Transportation Needs (5/9/2024)    PRAPARE - Transportation     Lack of Transportation (Medical): No     Lack of Transportation (Non-Medical): No   Physical Activity: Not on file   Stress: Not on file   Social Connections: Not on file   Intimate Partner Violence: Not on file   Housing Stability: Unknown (5/9/2024)    Housing Stability Vital Sign     Unable to Pay for Housing in the Last Year: No     Number of Times Moved in the Last Year: Not on file     Homeless in the Last Year: No     Past Medical History:   Diagnosis Date    Cancer (HCC)     Diabetes mellitus (HCC)     High blood pressure     Hypertension     Lung cancer (HCC)     Lung disorder     Urinary problem      Past Surgical History:   Procedure Laterality Date    BRAIN SURGERY      CHOLECYSTECTOMY      LUNG CANCER SURGERY  05/2019    partial lung removed     TONSILLECTOMY       Current Medications[1]    Lab Results   Component Value Date    SODIUM 141 10/29/2024    K 3.9 10/29/2024     10/29/2024    CO2 30 10/29/2024    AGAP 5 10/29/2024    BUN 23 10/29/2024    CREATININE 1.77 (H) 10/29/2024    GLUC 138 10/29/2024    GLUF 116 (H) 08/08/2024    CALCIUM 7.9 (L) 10/29/2024    AST 26 10/29/2024    ALT 32 10/29/2024    ALKPHOS 145 (H) 10/29/2024    TP 5.5 (L) 10/29/2024    TBILI 0.36 10/29/2024     "EGFR 37 10/29/2024     Lab Results   Component Value Date    WBC 5.16 10/29/2024    HGB 12.3 10/29/2024    HCT 37.2 10/29/2024    MCV 91 10/29/2024     (L) 10/29/2024     Lab Results   Component Value Date    CHOLESTEROL 110 08/08/2024    CHOLESTEROL 151 06/17/2018    CHOLESTEROL 152 06/17/2018     Lab Results   Component Value Date    HDL 36 (L) 08/08/2024    HDL 31 (L) 06/17/2018    HDL 32 (L) 06/17/2018     Lab Results   Component Value Date    LDLCALC 63 08/08/2024    LDLCALC 89 06/17/2018    LDLCALC 90 06/17/2018     Lab Results   Component Value Date    TRIG 54 08/08/2024    TRIG 154 (H) 06/17/2018    TRIG 150 06/17/2018     No results found for: \"CHOLHDL\"  Lab Results   Component Value Date    DET1XECYFALJ 1.936 06/17/2018     Lab Results   Component Value Date    PTH 39.2 08/08/2024    CALCIUM 7.9 (L) 10/29/2024    PHOS 3.0 08/08/2024     No results found for: \"SPEP\", \"UPEP\"  No results found for: \"MICROALBUR\", \"[id number]\"        Objective:      /70 (BP Location: Left arm, Patient Position: Sitting, Cuff Size: Standard)   Pulse (!) 52   Temp 97.6 °F (36.4 °C) (Temporal)   Ht 5' 4\" (1.626 m)   Wt 72.6 kg (160 lb)   SpO2 99%   BMI 27.46 kg/m²          Physical Exam  Vitals reviewed.   Constitutional:       General: He is not in acute distress.     Appearance: Normal appearance.   HENT:      Head: Normocephalic and atraumatic.      Right Ear: External ear normal.      Left Ear: External ear normal.     Eyes:      Conjunctiva/sclera: Conjunctivae normal.       Cardiovascular:      Rate and Rhythm: Normal rate and regular rhythm.      Heart sounds: Normal heart sounds.   Pulmonary:      Effort: No respiratory distress.      Breath sounds: No wheezing.   Abdominal:      Palpations: Abdomen is soft.     Musculoskeletal:      Right lower leg: Edema present.      Left lower leg: Edema present.     Skin:     General: Skin is warm and dry.     Neurological:      General: No focal deficit present.     "  Mental Status: He is alert and oriented to person, place, and time.     Psychiatric:         Mood and Affect: Mood normal.         Behavior: Behavior normal.              [1]   Current Outpatient Medications:     apixaban (Eliquis) 5 mg, Take 5 mg by mouth in the morning and 5 mg in the evening., Disp: , Rfl:     Osimertinib Mesylate 80 MG TABS, Take 80 mg by mouth in the morning, Disp: , Rfl:     Tepotinib HCl (Tepmetko) 225 MG TABS, Take 225 mg by mouth in the morning, Disp: , Rfl:     torsemide (DEMADEX) 20 mg tablet, Take 1 tablet (20 mg total) by mouth daily, Disp: 90 tablet, Rfl: 1

## 2025-05-15 NOTE — ASSESSMENT & PLAN NOTE
Encouraged the patient to continue to increase protein intake, bilateral lower extremity edema may be improved with higher serum protein levels.  Patient will try to increase his egg intake from 2 eggs in the morning up to 3 eggs in the morning.  Additional protein supplementation as tolerated going forward.